# Patient Record
Sex: FEMALE | Race: WHITE | URBAN - METROPOLITAN AREA
[De-identification: names, ages, dates, MRNs, and addresses within clinical notes are randomized per-mention and may not be internally consistent; named-entity substitution may affect disease eponyms.]

---

## 2017-02-04 ENCOUNTER — HOSPITAL ENCOUNTER (EMERGENCY)
Facility: CLINIC | Age: 67
Discharge: HOME | End: 2017-02-04
Attending: EMERGENCY MEDICINE | Admitting: EMERGENCY MEDICINE
Payer: COMMERCIAL

## 2017-02-04 ENCOUNTER — APPOINTMENT (OUTPATIENT)
Dept: GENERAL RADIOLOGY | Facility: CLINIC | Age: 67
End: 2017-02-04
Attending: EMERGENCY MEDICINE
Payer: COMMERCIAL

## 2017-02-04 VITALS
OXYGEN SATURATION: 97 % | SYSTOLIC BLOOD PRESSURE: 184 MMHG | HEIGHT: 64 IN | BODY MASS INDEX: 24.92 KG/M2 | HEART RATE: 80 BPM | WEIGHT: 146 LBS | TEMPERATURE: 98.3 F | DIASTOLIC BLOOD PRESSURE: 85 MMHG | RESPIRATION RATE: 18 BRPM

## 2017-02-04 DIAGNOSIS — M54.50 ACUTE RIGHT-SIDED LOW BACK PAIN WITHOUT SCIATICA: ICD-10-CM

## 2017-02-04 LAB
ALBUMIN UR-MCNC: NEGATIVE MG/DL
APPEARANCE UR: CLEAR
BACTERIA #/AREA URNS HPF: ABNORMAL /HPF
BILIRUB UR QL STRIP: NEGATIVE
COLOR UR AUTO: YELLOW
GLUCOSE UR STRIP-MCNC: NEGATIVE MG/DL
HGB UR QL STRIP: NEGATIVE
KETONES UR STRIP-MCNC: NEGATIVE MG/DL
LEUKOCYTE ESTERASE UR QL STRIP: NEGATIVE
MUCOUS THREADS #/AREA URNS LPF: PRESENT /LPF
NITRATE UR QL: NEGATIVE
PH UR STRIP: 6.5 PH (ref 5–7)
RBC #/AREA URNS AUTO: 2 /HPF (ref 0–2)
SP GR UR STRIP: 1.01 (ref 1–1.03)
SQUAMOUS #/AREA URNS AUTO: <1 /HPF (ref 0–1)
URN SPEC COLLECT METH UR: ABNORMAL
UROBILINOGEN UR STRIP-MCNC: NORMAL MG/DL (ref 0–2)
WBC #/AREA URNS AUTO: 1 /HPF (ref 0–2)

## 2017-02-04 PROCEDURE — 76705 ECHO EXAM OF ABDOMEN: CPT

## 2017-02-04 PROCEDURE — 73502 X-RAY EXAM HIP UNI 2-3 VIEWS: CPT

## 2017-02-04 PROCEDURE — 99285 EMERGENCY DEPT VISIT HI MDM: CPT | Mod: 25

## 2017-02-04 PROCEDURE — 72100 X-RAY EXAM L-S SPINE 2/3 VWS: CPT

## 2017-02-04 PROCEDURE — 81001 URINALYSIS AUTO W/SCOPE: CPT | Performed by: EMERGENCY MEDICINE

## 2017-02-04 PROCEDURE — 25000132 ZZH RX MED GY IP 250 OP 250 PS 637: Performed by: EMERGENCY MEDICINE

## 2017-02-04 RX ORDER — OXYCODONE HYDROCHLORIDE 5 MG/1
5-10 TABLET ORAL EVERY 6 HOURS PRN
Qty: 15 TABLET | Refills: 0 | Status: ON HOLD | OUTPATIENT
Start: 2017-02-04 | End: 2017-05-10

## 2017-02-04 RX ORDER — METHYLPREDNISOLONE 4 MG
TABLET, DOSE PACK ORAL
Qty: 21 TABLET | Refills: 0 | Status: ON HOLD | OUTPATIENT
Start: 2017-02-04 | End: 2017-05-10

## 2017-02-04 RX ORDER — OXYCODONE HYDROCHLORIDE 5 MG/1
10 TABLET ORAL ONCE
Status: COMPLETED | OUTPATIENT
Start: 2017-02-04 | End: 2017-02-04

## 2017-02-04 RX ORDER — METOPROLOL SUCCINATE 100 MG/1
100 TABLET, EXTENDED RELEASE ORAL DAILY
Status: ON HOLD | COMMUNITY
Start: 2016-12-13 | End: 2017-05-10

## 2017-02-04 RX ORDER — LISINOPRIL AND HYDROCHLOROTHIAZIDE 12.5; 2 MG/1; MG/1
1 TABLET ORAL
Status: ON HOLD | COMMUNITY
Start: 2016-12-13 | End: 2017-05-10

## 2017-02-04 RX ORDER — AMOXICILLIN 250 MG
2 CAPSULE ORAL 2 TIMES DAILY
Qty: 120 TABLET | Refills: 1 | Status: ON HOLD | OUTPATIENT
Start: 2017-02-04 | End: 2017-05-10

## 2017-02-04 RX ADMIN — OXYCODONE HYDROCHLORIDE 10 MG: 5 TABLET ORAL at 08:42

## 2017-02-04 ASSESSMENT — ENCOUNTER SYMPTOMS
BACK PAIN: 1
FEVER: 0

## 2017-02-04 NOTE — ED PROVIDER NOTES
"  History     Chief Complaint:  Back Pain      HPI   Daly Dixon is a 66 year old female, S/P right hip replacement, who presents with back pain. The patient reports the sudden onset of right-sided lumbar back pain yesterday after coughing. She reports radiation to her right groin and exacerbation with movement. She reports significant relief with using a heating pad last night. She denies fevers. She denies incontinence. The patient reports she took 600 mg of Advil today. She denies a history of back pain. Her orthopedist is Dr. Bob.  No numbness, weakness, bowel or bladder incontinence.  No history of cancer, IVDU, TB, unintentional weight loss.    Allergies:  NKDA     Medications:    Lisinopril-hydrochlorothiazide  Metoprolol     Past Medical History:    HLD   HTN    Past Surgical History:    Tonsillectomy and adenoidectomy  Right hip replacement     Family History:    History reviewed. No pertinent family history.       Social History:  Marital status:   Never smoker, positive for alcohol use.  The patient presents with .  From [place] originally.  She has four children.    Review of Systems   Constitutional: Negative for fever.   Musculoskeletal: Positive for back pain.   All other systems reviewed and are negative.    Physical Exam     Patient Vitals for the past 24 hrs:   BP Temp Temp src Pulse Resp SpO2 Height Weight   02/04/17 1005 184/85 mmHg - - 80 18 97 % - -   02/04/17 0915 (!) 193/109 mmHg - - 83 18 96 % - -   02/04/17 0826 190/90 mmHg - - - - - - -   02/04/17 0817 (!) 198/95 mmHg 98.3  F (36.8  C) Oral 96 18 96 % 1.626 m (5' 4\") 66.225 kg (146 lb)      Physical Exam  General: Well-nourished, appears to be in pain  Eyes: PERRL, conjunctivae pink no scleral icterus or conjunctival injection  ENT:  Moist mucus membranes, posterior oropharynx clear without erythema or exudates  Respiratory:  Lungs clear to auscultation bilaterally, no crackles/rubs/wheezes.  Good air movement  CV: " Normal rate and rhythm, no murmurs/rubs/gallops  GI:  Abdomen soft and non-distended.  Normoactive BS.  No tenderness, guarding or rebound  Skin: Warm, dry.  No rashes or petechiae  Musculoskeletal: No peripheral edema or calf tenderness.  Tender over right mid lumbar paraspinal muscles  Neuro: Alert and oriented to person/place/time. Normal saddle sensation.  Normal and symmetric reflexes at patella tendon bilaterally.  Normal and symmetric strength at BLE.  Psychiatric: Normal affect      Emergency Department Course     Imaging:  Radiographic findings were communicated with the patient who voiced understanding of the findings.    Pelvis with Right Hip XR per radiology:   Again seen are surgical changes of right total hip arthroplasty. The hardware remains intact and unremarkable with no adjacent lucency seen to suggest loosening. Previously seen overlying skin staples have been removed. No fracture is seen. There has been development or progression of prominent degenerative changes of the left hip joint including complete superior joint space loss. No other change or abnormality is seen.     Lumbar Spine XR per radiology:   Degenerative changes as described above.      Laboratory:  UA: Clear, yellow urine; Bacteria Few (A) Mucous Present (A) Rest WNL     Procedures:  ED Bedside Limited Abdominal Ultrasound:  In process by Darlin Jones MD (02/04/17 15:40:59)     Impression:     No aortic aneurysm apparent on bedside ultrasound       Interventions:  0842: Oxycodone, 10 mg, PO      ED Course:  Nursing notes and vitals reviewed.  I performed an exam of the patient as documented above.     0928: I performed the above procedure. I updated the patient on the results. She is ready for discharge.    I personally reviewed the laboratory and imaging results with the patient and answered all related questions prior to discharge.   Findings and plan explained to the patient. Patient discharged home with instructions regarding  supportive care, medications, and reasons to return. The importance of close follow-up was reviewed.     Impression & Plan      Medical Decision Making:  Daly Dixon is a delightful 66 year old patient who presents with back pain.  Pain has improved with interventions in the emergency department.  Xrays were obtained of her lumbar spine and her hip and showed no acute fractures.  A bedside ultrasound showed no aortic aneurysm concerning for a aortic rupture. She has not had a fever, saddle/perineal anesthesia, bilateral foot numbness, or bowel or bladder dysfunction.  He has no red flags in history of cancer or IVDU. There is no clinical evidence of cauda equina syndrome, discitis, spinal/epidural space hematoma or epidural abscess. The neurological exam is normal and the patient's symptoms are consistent with a musculoskeletal (myofascial) strain vs a herniated disc. The patient will be discharged with pain medications to use as directed.  Ice or heat to the back and stretching exercises.  No heavy lifting, bending or twisting. Return if increasing pain, numbness, weakness, or bowel or bladder dysfunction.  The patient was advised to schedule follow-up with his/her primary doctor within 3 days to re-assess symptoms.  She also plans to see Dr. Alas from [facility] who cares for her .    Diagnosis:    ICD-10-CM    1. Acute right-sided low back pain without sciatica M54.5      Disposition:   Discharge to home with primary care follow up.     Discharge Medications:   New Prescriptions    METHYLPREDNISOLONE (MEDROL DOSEPAK) 4 MG TABLET    Follow package instructions    OXYCODONE (ROXICODONE) 5 MG IR TABLET    Take 1-2 tablets (5-10 mg) by mouth every 6 hours as needed for breakthrough pain or moderate to severe pain    SENNA-DOCUSATE (SENOKOT-S;PERICOLACE) 8.6-50 MG PER TABLET    Take 2 tablets by mouth 2 times daily     Rosa Maria LIU am serving as a scribe on 2/4/2017 at 8:30 AM to personally document services  performed by Darlin Jones MD, based on my observations and the provider's statements to me.              Darlin Jones MD  02/04/17 1546

## 2017-02-04 NOTE — ED AVS SNAPSHOT
Emergency Department    [address] [facility] [address]    Phone:  [phone]    Fax:  [phone]                                       Daly Dixon   MRN: [id number]    Department:   Emergency Department   Date of Visit:  2/4/2017           Patient Information     Date Of Birth          1950        Your diagnoses for this visit were:     Acute right-sided low back pain without sciatica        You were seen by Darlin Jones MD.      Follow-up Information     Follow up with RHONDA ERVIN MD PA. Schedule an appointment as soon as possible for a visit in 1 week.    Contact information:    [address]   [address]  [facility] [phone]        Discharge Instructions       *You may resume diet and activities as tolerated.  *Take medications as prescribed.  Tylenol and/or ibuprofen for pain, oxycodone for severe breakthrough pain.  Solumedrol dose pack if your symptoms continue or worsen. Continue your current medications.  *Follow-up with your doctor in the next 2-3 days for reevaluation and ongoing medication prescriptions.  Follow-up with Dr. Lam regarding physical therapy and other treatments.   *Return if you develop numbness, weakness, bowel or bladder incontinence, faint or feel like you will faint or become worse in any way.    Discharge Instructions  Back Pain  You were seen today for back pain. Back pain can have many causes, but most will get better without surgery or other specific treatment. Sometimes there is a herniated ( slipped ) disc. We don t usually do MRI scans to look for these right away, since most herniated discs will get better on their own with time.  Today, we did not find any evidence that your back pain was caused by a serious condition, such as an infection, fracture, or tumor. However, sometimes symptoms develop over time and cannot be found during an emergency visit, so it is very important that you follow up with your primary doctor.  Return to the  Emergency Department if:    You develop a fever with your back pain.     You have weakness or change in sensation in one or both legs.    You lose control of your bowels or bladder, or can t empty your bladder.    Your pain gets much worse.     Follow-up with your doctor:    Unless your pain has completely gone away, please make an appointment with your doctor within one week.  You may need further management of your back pain, such as more pain medication, imaging such as an X-ray or MRI, or physical therapy.    What can I do to help myself?    Remain active -- People are often afraid that they will hurt their back further or delay recovery by remaining active, but this is one of the best things you can do for your back. In fact, prolonged bed rest is not recommended. Studies have shown that people with low back pain recover faster when they remain active. Movement helps to bring blood flow to the muscles and relieve muscle spasms as well as preventing loss of muscle strength.    Heat -- Using a heating pad can help with low back pain during the first few weeks. Do not sleep with a heating pad, as you can be burned.     Pain medications -- Take a pain medication such as, acetaminophen (Tylenol ), ibuprofen (Advil , Nuprin  ) or naproxen (Aleve ).  If you have been given a narcotic (such as codeine, hydrocodone, or oxycodone) or a muscle relaxant (such as Flexeril   or Soma  ), do not drive for four hours after you have taken it. If the narcotic contains acetaminophen (Tylenol), do not take Tylenol with it. All narcotics will cause constipation, so eat a high fiber diet.          Remember that you can always come back to the Emergency Department if you are not able to see your regular doctor in the amount of time listed above, if you get any new symptoms, or if there is anything that worries you.    Opioid Medication Information    You have been given a prescription for an opioid (narcotic) pain medicine and/or have  received a pain medicine while here in the Emergency Department. These medicines can make you drowsy or impaired. You must not drive, operate dangerous equipment, or engage in any other dangerous activities while taking these medications. If you drive while taking these medications, you could be arrested for DUI, or driving under the influence. Do not drink any alcohol while you are taking these medications.   Opioid pain medications can cause addiction. If you have a history of chemical dependency of any type, you are at a higher risk of becoming addicted to pain medications.  Only take these prescribed medications to treat your pain when all other options have been tried. Take it for as short a time and as few doses as possible. Store your pain pills in a secure place, as they are frequently stolen and provide a dangerous opportunity for children or visitors in your house to start abusing these powerful medications. We will not replace any lost or stolen medicine.  As soon as your pain is better, you should flush all your remaining medication.   Many prescription pain medications contain Tylenol  (acetaminophen), including Vicodin , Tylenol #3 , Norco , Lortab , and Percocet .  You should not take any extra pills of Tylenol  if you are using these prescription medications or you can get very sick.  Do not ever take more than 4000 mg of acetaminophen in any 24 hour period.  All opioids tend to cause constipation. Drink plenty of water and eat foods that have a lot of fiber, such as fruits, vegetables, prune juice, apple juice and high fiber cereal.  Take a laxative if you don t move your bowels at least every other day. Miralax , Milk of Magnesia, Colace , or Senna  can be used to keep you regular.                24 Hour Appointment Hotline       To make an appointment at any [facility] clinic, call [phone] ([phone]). If you don't have a family doctor or clinic, we will help you find one. [facility]  clinics are conveniently located to serve the needs of you and your family.             Review of your medicines      START taking        Dose / Directions Last dose taken    methylPREDNISolone 4 MG tablet   Commonly known as:  MEDROL DOSEPAK   Quantity:  21 tablet        Follow package instructions   Refills:  0        oxyCODONE 5 MG IR tablet   Commonly known as:  ROXICODONE   Dose:  5-10 mg   Quantity:  15 tablet        Take 1-2 tablets (5-10 mg) by mouth every 6 hours as needed for breakthrough pain or moderate to severe pain   Refills:  0        senna-docusate 8.6-50 MG per tablet   Commonly known as:  SENOKOT-S;PERICOLACE   Dose:  2 tablet   Quantity:  120 tablet        Take 2 tablets by mouth 2 times daily   Refills:  1          Our records show that you are taking the medicines listed below. If these are incorrect, please call your family doctor or clinic.        Dose / Directions Last dose taken    lisinopril-hydrochlorothiazide 20-12.5 MG per tablet   Commonly known as:  PRINZIDE/ZESTORETIC   Dose:  1 tablet        Take 1 tablet by mouth   Refills:  0        metoprolol 100 MG 24 hr tablet   Commonly known as:  TOPROL-XL   Dose:  100 mg        Take 100 mg by mouth   Refills:  0                Prescriptions were sent or printed at these locations (3 Prescriptions)                   Other Prescriptions                Printed at Department/Unit printer (3 of 3)         oxyCODONE (ROXICODONE) 5 MG IR tablet               methylPREDNISolone (MEDROL DOSEPAK) 4 MG tablet               senna-docusate (SENOKOT-S;PERICOLACE) 8.6-50 MG per tablet                Procedures and tests performed during your visit     Lumbar spine XR, 2-3 views    POC US ABDOMEN LIMITED    UA with Microscopic    XR Pelvis w Hip Right 1 View      Orders Needing Specimen Collection     None      Pending Results     Date and Time Order Name Status Description    2/4/2017 8686 POC US ABDOMEN LIMITED In process             Pending Culture  Results     No orders found from 2/3/2017 to 2/5/2017.       Test Results from your hospital stay           2/4/2017  9:24 AM - Interface, Radiant Ib      Narrative     LUMBAR SPINE TWO VIEWS   2/4/2017  9:11 AM    HISTORY: Right-sided low back pain.    COMPARISON: None.    FINDINGS: There is a grade 1 spondylolisthesis at L4-L5 which appears  to be degenerative. Vertebral body alignment is otherwise normal with  no fracture or osseous lesion seen. There is mild disc height loss and  marginal osteophyte formation at L2-L3. The remaining disc spaces are  well preserved. There is calcification in the vascular structures.        Impression     IMPRESSION: Degenerative changes as described above.     SONAL TORRES MD         2/4/2017  8:40 AM - Service Account, [facility]         2/4/2017  9:24 AM - Interface, Flexilab Results      Component Results     Component Value Ref Range & Units Status    Color Urine Yellow  Final    Appearance Urine Clear  Final    Glucose Urine Negative NEG mg/dL Final    Bilirubin Urine Negative NEG Final    Ketones Urine Negative NEG mg/dL Final    Specific Gravity Urine 1.014 1.003 - 1.035 Final    Blood Urine Negative NEG Final    pH Urine 6.5 5.0 - 7.0 pH Final    Protein Albumin Urine Negative NEG mg/dL Final    Urobilinogen mg/dL Normal 0.0 - 2.0 mg/dL Final    Nitrite Urine Negative NEG Final    Leukocyte Esterase Urine Negative NEG Final    Source Midstream Urine  Final    WBC Urine 1 0 - 2 /HPF Final    RBC Urine 2 0 - 2 /HPF Final    Bacteria Urine Few (A) NEG /HPF Final    Squamous Epithelial /HPF Urine <1 0 - 1 /HPF Final    Mucous Urine Present (A) NEG /LPF Final         2/4/2017  9:24 AM - Interface, Radiant Ib      Narrative     PELVIS AND RIGHT HIP ONE VIEW   2/4/2017 9:11 AM    HISTORY: Right hip pain.    COMPARISON: 11/11/2010        Impression     IMPRESSION: Again seen are surgical changes of right total hip  arthroplasty. The hardware remains intact and unremarkable  with no  adjacent lucency seen to suggest loosening. Previously seen overlying  skin staples have been removed. No fracture is seen. There has been  development or progression of prominent degenerative changes of the  left hip joint including complete superior joint space loss. No other  change or abnormality is seen.     SONAL TORRES MD                Clinical Quality Measure: Blood Pressure Screening     Your blood pressure was checked while you were in the emergency department today. The last reading we obtained was  BP: (!) 193/109 mmHg . Please read the guidelines below about what these numbers mean and what you should do about them.  If your systolic blood pressure (the top number) is less than 120 and your diastolic blood pressure (the bottom number) is less than 80, then your blood pressure is normal. There is nothing more that you need to do about it.  If your systolic blood pressure (the top number) is 120-139 or your diastolic blood pressure (the bottom number) is 80-89, your blood pressure may be higher than it should be. You should have your blood pressure rechecked within a year by a primary care provider.  If your systolic blood pressure (the top number) is 140 or greater or your diastolic blood pressure (the bottom number) is 90 or greater, you may have high blood pressure. High blood pressure is treatable, but if left untreated over time it can put you at risk for heart attack, stroke, or kidney failure. You should have your blood pressure rechecked by a primary care provider within the next 4 weeks.  If your provider in the emergency department today gave you specific instructions to follow-up with your doctor or provider even sooner than that, you should follow that instruction and not wait for up to 4 weeks for your follow-up visit.        Thank you for choosing [facility]       Thank you for choosing [facility] for your care. Our goal is always to provide you with excellent care. Hearing back  "from our patients is one way we can continue to improve our services. Please take a few minutes to complete the written survey that you may receive in the mail after you visit with us. Thank you!        [employer] Information     [employer] lets you send messages to your doctor, view your test results, renew your prescriptions, schedule appointments and more. To sign up, go to [email or web address] . Click on \"Log in\" on the left side of the screen, which will take you to the Welcome page. Then click on \"Sign up Now\" on the right side of the page.     You will be asked to enter the access code listed below, as well as some personal information. Please follow the directions to create your username and password.     Your access code is: [email or web address]  Expires: 2017  9:46 AM     Your access code will  in 90 days. If you need help or a new code, please call your [facility] clinic or [phone].        Care EveryWhere ID     This is your Care EveryWhere ID. This could be used by other organizations to access your [facility] medical records  [id number]        After Visit Summary       This is your record. Keep this with you and show to your community pharmacist(s) and doctor(s) at your next visit.                  "

## 2017-02-04 NOTE — ED AVS SNAPSHOT
Emergency Department    [address] [place] [address]    Phone:  [phone]    Fax:  [phone]                                       Daly Dixon   MRN: [id number]    Department:   Emergency Department   Date of Visit:  2/4/2017           After Visit Summary Signature Page     I have received my discharge instructions, and my questions have been answered. I have discussed any challenges I see with this plan with the nurse or doctor.    ..........................................................................................................................................  Patient/Patient Representative Signature      ..........................................................................................................................................  Patient Representative Print Name and Relationship to Patient    ..................................................               ................................................  Date                                            Time    ..........................................................................................................................................  Reviewed by Signature/Title    ...................................................              ..............................................  Date                                                            Time

## 2017-02-04 NOTE — DISCHARGE INSTRUCTIONS
*You may resume diet and activities as tolerated.  *Take medications as prescribed.  Tylenol and/or ibuprofen for pain, oxycodone for severe breakthrough pain.  Solumedrol dose pack if your symptoms continue or worsen. Continue your current medications.  *Follow-up with your doctor in the next 2-3 days for reevaluation and ongoing medication prescriptions.  Follow-up with Dr. Lam regarding physical therapy and other treatments.   *Return if you develop numbness, weakness, bowel or bladder incontinence, faint or feel like you will faint or become worse in any way.    Discharge Instructions  Back Pain  You were seen today for back pain. Back pain can have many causes, but most will get better without surgery or other specific treatment. Sometimes there is a herniated ( slipped ) disc. We don t usually do MRI scans to look for these right away, since most herniated discs will get better on their own with time.  Today, we did not find any evidence that your back pain was caused by a serious condition, such as an infection, fracture, or tumor. However, sometimes symptoms develop over time and cannot be found during an emergency visit, so it is very important that you follow up with your primary doctor.  Return to the Emergency Department if:    You develop a fever with your back pain.     You have weakness or change in sensation in one or both legs.    You lose control of your bowels or bladder, or can t empty your bladder.    Your pain gets much worse.     Follow-up with your doctor:    Unless your pain has completely gone away, please make an appointment with your doctor within one week.  You may need further management of your back pain, such as more pain medication, imaging such as an X-ray or MRI, or physical therapy.    What can I do to help myself?    Remain active -- People are often afraid that they will hurt their back further or delay recovery by remaining active, but this is one of the best things you can do  for your back. In fact, prolonged bed rest is not recommended. Studies have shown that people with low back pain recover faster when they remain active. Movement helps to bring blood flow to the muscles and relieve muscle spasms as well as preventing loss of muscle strength.    Heat -- Using a heating pad can help with low back pain during the first few weeks. Do not sleep with a heating pad, as you can be burned.     Pain medications -- Take a pain medication such as, acetaminophen (Tylenol ), ibuprofen (Advil , Nuprin  ) or naproxen (Aleve ).  If you have been given a narcotic (such as codeine, hydrocodone, or oxycodone) or a muscle relaxant (such as Flexeril   or Soma  ), do not drive for four hours after you have taken it. If the narcotic contains acetaminophen (Tylenol), do not take Tylenol with it. All narcotics will cause constipation, so eat a high fiber diet.          Remember that you can always come back to the Emergency Department if you are not able to see your regular doctor in the amount of time listed above, if you get any new symptoms, or if there is anything that worries you.    Opioid Medication Information    You have been given a prescription for an opioid (narcotic) pain medicine and/or have received a pain medicine while here in the Emergency Department. These medicines can make you drowsy or impaired. You must not drive, operate dangerous equipment, or engage in any other dangerous activities while taking these medications. If you drive while taking these medications, you could be arrested for DUI, or driving under the influence. Do not drink any alcohol while you are taking these medications.   Opioid pain medications can cause addiction. If you have a history of chemical dependency of any type, you are at a higher risk of becoming addicted to pain medications.  Only take these prescribed medications to treat your pain when all other options have been tried. Take it for as short a time and  as few doses as possible. Store your pain pills in a secure place, as they are frequently stolen and provide a dangerous opportunity for children or visitors in your house to start abusing these powerful medications. We will not replace any lost or stolen medicine.  As soon as your pain is better, you should flush all your remaining medication.   Many prescription pain medications contain Tylenol  (acetaminophen), including Vicodin , Tylenol #3 , Norco , Lortab , and Percocet .  You should not take any extra pills of Tylenol  if you are using these prescription medications or you can get very sick.  Do not ever take more than 4000 mg of acetaminophen in any 24 hour period.  All opioids tend to cause constipation. Drink plenty of water and eat foods that have a lot of fiber, such as fruits, vegetables, prune juice, apple juice and high fiber cereal.  Take a laxative if you don t move your bowels at least every other day. Miralax , Milk of Magnesia, Colace , or Senna  can be used to keep you regular.

## 2017-05-10 ENCOUNTER — APPOINTMENT (OUTPATIENT)
Dept: PHYSICAL THERAPY | Facility: CLINIC | Age: 67
DRG: 470 | End: 2017-05-10
Attending: ORTHOPAEDIC SURGERY
Payer: MEDICARE

## 2017-05-10 ENCOUNTER — APPOINTMENT (OUTPATIENT)
Dept: GENERAL RADIOLOGY | Facility: CLINIC | Age: 67
DRG: 470 | End: 2017-05-10
Attending: ORTHOPAEDIC SURGERY
Payer: MEDICARE

## 2017-05-10 ENCOUNTER — ANESTHESIA (OUTPATIENT)
Dept: SURGERY | Facility: CLINIC | Age: 67
DRG: 470 | End: 2017-05-10
Payer: MEDICARE

## 2017-05-10 ENCOUNTER — HOSPITAL ENCOUNTER (INPATIENT)
Facility: CLINIC | Age: 67
LOS: 1 days | Discharge: HOME | DRG: 470 | End: 2017-05-11
Attending: ORTHOPAEDIC SURGERY | Admitting: ORTHOPAEDIC SURGERY
Payer: MEDICARE

## 2017-05-10 ENCOUNTER — ANESTHESIA EVENT (OUTPATIENT)
Dept: SURGERY | Facility: CLINIC | Age: 67
DRG: 470 | End: 2017-05-10
Payer: MEDICARE

## 2017-05-10 DIAGNOSIS — Z96.642 STATUS POST TOTAL REPLACEMENT OF LEFT HIP: Primary | ICD-10-CM

## 2017-05-10 PROBLEM — Z96.649 S/P TOTAL HIP ARTHROPLASTY: Status: ACTIVE | Noted: 2017-05-10

## 2017-05-10 LAB
ABO + RH BLD: NORMAL
ABO + RH BLD: NORMAL
BLD GP AB SCN SERPL QL: NORMAL
BLOOD BANK CMNT PATIENT-IMP: NORMAL
CREAT SERPL-MCNC: 0.76 MG/DL (ref 0.52–1.04)
GFR SERPL CREATININE-BSD FRML MDRD: 77 ML/MIN/1.7M2
POTASSIUM SERPL-SCNC: 3.7 MMOL/L (ref 3.4–5.3)
SPECIMEN EXP DATE BLD: NORMAL

## 2017-05-10 PROCEDURE — 25800025 ZZH RX 258: Performed by: ORTHOPAEDIC SURGERY

## 2017-05-10 PROCEDURE — 86850 RBC ANTIBODY SCREEN: CPT | Performed by: ANESTHESIOLOGY

## 2017-05-10 PROCEDURE — C1776 JOINT DEVICE (IMPLANTABLE): HCPCS | Performed by: ORTHOPAEDIC SURGERY

## 2017-05-10 PROCEDURE — 37000008 ZZH ANESTHESIA TECHNICAL FEE, 1ST 30 MIN: Performed by: ORTHOPAEDIC SURGERY

## 2017-05-10 PROCEDURE — 25000132 ZZH RX MED GY IP 250 OP 250 PS 637: Mod: GY | Performed by: ORTHOPAEDIC SURGERY

## 2017-05-10 PROCEDURE — 36000063 ZZH SURGERY LEVEL 4 EA 15 ADDTL MIN: Performed by: ORTHOPAEDIC SURGERY

## 2017-05-10 PROCEDURE — 93010 ELECTROCARDIOGRAM REPORT: CPT | Performed by: INTERNAL MEDICINE

## 2017-05-10 PROCEDURE — 27810169 ZZH OR IMPLANT GENERAL: Performed by: ORTHOPAEDIC SURGERY

## 2017-05-10 PROCEDURE — 25000125 ZZHC RX 250: Performed by: NURSE ANESTHETIST, CERTIFIED REGISTERED

## 2017-05-10 PROCEDURE — 0SRB01A REPLACEMENT OF LEFT HIP JOINT WITH METAL SYNTHETIC SUBSTITUTE, UNCEMENTED, OPEN APPROACH: ICD-10-PCS | Performed by: ORTHOPAEDIC SURGERY

## 2017-05-10 PROCEDURE — 36000093 ZZH SURGERY LEVEL 4 1ST 30 MIN: Performed by: ORTHOPAEDIC SURGERY

## 2017-05-10 PROCEDURE — 25000128 H RX IP 250 OP 636: Performed by: ORTHOPAEDIC SURGERY

## 2017-05-10 PROCEDURE — 25000128 H RX IP 250 OP 636: Performed by: NURSE ANESTHETIST, CERTIFIED REGISTERED

## 2017-05-10 PROCEDURE — 93005 ELECTROCARDIOGRAM TRACING: CPT

## 2017-05-10 PROCEDURE — 84132 ASSAY OF SERUM POTASSIUM: CPT | Performed by: ANESTHESIOLOGY

## 2017-05-10 PROCEDURE — 97530 THERAPEUTIC ACTIVITIES: CPT | Mod: GP

## 2017-05-10 PROCEDURE — S5010 5% DEXTROSE AND 0.45% SALINE: HCPCS | Performed by: ORTHOPAEDIC SURGERY

## 2017-05-10 PROCEDURE — 40000986 XR PELVIS AND HIP PORTABLE LEFT 1 VIEW

## 2017-05-10 PROCEDURE — 71000012 ZZH RECOVERY PHASE 1 LEVEL 1 FIRST HR: Performed by: ORTHOPAEDIC SURGERY

## 2017-05-10 PROCEDURE — 82565 ASSAY OF CREATININE: CPT | Performed by: ANESTHESIOLOGY

## 2017-05-10 PROCEDURE — A9270 NON-COVERED ITEM OR SERVICE: HCPCS | Mod: GY | Performed by: ORTHOPAEDIC SURGERY

## 2017-05-10 PROCEDURE — 25800025 ZZH RX 258: Performed by: ANESTHESIOLOGY

## 2017-05-10 PROCEDURE — 86900 BLOOD TYPING SEROLOGIC ABO: CPT | Performed by: ANESTHESIOLOGY

## 2017-05-10 PROCEDURE — 40000170 ZZH STATISTIC PRE-PROCEDURE ASSESSMENT II: Performed by: ORTHOPAEDIC SURGERY

## 2017-05-10 PROCEDURE — 25000125 ZZHC RX 250: Performed by: ANESTHESIOLOGY

## 2017-05-10 PROCEDURE — 36415 COLL VENOUS BLD VENIPUNCTURE: CPT | Performed by: ANESTHESIOLOGY

## 2017-05-10 PROCEDURE — 97110 THERAPEUTIC EXERCISES: CPT | Mod: GP

## 2017-05-10 PROCEDURE — 86901 BLOOD TYPING SEROLOGIC RH(D): CPT | Performed by: ANESTHESIOLOGY

## 2017-05-10 PROCEDURE — 97161 PT EVAL LOW COMPLEX 20 MIN: CPT | Mod: GP

## 2017-05-10 PROCEDURE — 27210995 ZZH RX 272: Performed by: ORTHOPAEDIC SURGERY

## 2017-05-10 PROCEDURE — 12000007 ZZH R&B INTERMEDIATE

## 2017-05-10 PROCEDURE — 40000193 ZZH STATISTIC PT WARD VISIT

## 2017-05-10 PROCEDURE — 37000009 ZZH ANESTHESIA TECHNICAL FEE, EACH ADDTL 15 MIN: Performed by: ORTHOPAEDIC SURGERY

## 2017-05-10 PROCEDURE — 27210794 ZZH OR GENERAL SUPPLY STERILE: Performed by: ORTHOPAEDIC SURGERY

## 2017-05-10 DEVICE — IMP HEAD FEMORAL SNN 12/14 OXIN 36MM -3MM 71343603: Type: IMPLANTABLE DEVICE | Site: HIP | Status: FUNCTIONAL

## 2017-05-10 DEVICE — IMPLANTABLE DEVICE: Type: IMPLANTABLE DEVICE | Site: HIP | Status: FUNCTIONAL

## 2017-05-10 DEVICE — IMP SHELL SNR ACET R3 3H 52MM 71335552: Type: IMPLANTABLE DEVICE | Site: HIP | Status: FUNCTIONAL

## 2017-05-10 DEVICE — IMP HOLE COVER SNN ACETAB CUP REFLEC INTERFIT 71336500: Type: IMPLANTABLE DEVICE | Site: HIP | Status: FUNCTIONAL

## 2017-05-10 RX ORDER — FENTANYL CITRATE 50 UG/ML
INJECTION, SOLUTION INTRAMUSCULAR; INTRAVENOUS PRN
Status: DISCONTINUED | OUTPATIENT
Start: 2017-05-10 | End: 2017-05-10

## 2017-05-10 RX ORDER — MEPERIDINE HYDROCHLORIDE 25 MG/ML
12.5 INJECTION INTRAMUSCULAR; INTRAVENOUS; SUBCUTANEOUS EVERY 5 MIN PRN
Status: CANCELLED | OUTPATIENT
Start: 2017-05-10

## 2017-05-10 RX ORDER — EPHEDRINE SULFATE 50 MG/ML
INJECTION, SOLUTION INTRAMUSCULAR; INTRAVENOUS; SUBCUTANEOUS PRN
Status: DISCONTINUED | OUTPATIENT
Start: 2017-05-10 | End: 2017-05-10

## 2017-05-10 RX ORDER — CEFAZOLIN SODIUM 2 G/100ML
2 INJECTION, SOLUTION INTRAVENOUS
Status: COMPLETED | OUTPATIENT
Start: 2017-05-10 | End: 2017-05-10

## 2017-05-10 RX ORDER — CEFAZOLIN SODIUM 1 G/3ML
1 INJECTION, POWDER, FOR SOLUTION INTRAMUSCULAR; INTRAVENOUS EVERY 8 HOURS
Status: COMPLETED | OUTPATIENT
Start: 2017-05-10 | End: 2017-05-11

## 2017-05-10 RX ORDER — HYDROMORPHONE HYDROCHLORIDE 1 MG/ML
.3-.5 INJECTION, SOLUTION INTRAMUSCULAR; INTRAVENOUS; SUBCUTANEOUS EVERY 5 MIN PRN
Status: DISCONTINUED | OUTPATIENT
Start: 2017-05-10 | End: 2017-05-10 | Stop reason: HOSPADM

## 2017-05-10 RX ORDER — FENTANYL CITRATE 0.05 MG/ML
25-50 INJECTION, SOLUTION INTRAMUSCULAR; INTRAVENOUS
Status: CANCELLED | OUTPATIENT
Start: 2017-05-10

## 2017-05-10 RX ORDER — ONDANSETRON 2 MG/ML
INJECTION INTRAMUSCULAR; INTRAVENOUS PRN
Status: DISCONTINUED | OUTPATIENT
Start: 2017-05-10 | End: 2017-05-10

## 2017-05-10 RX ORDER — PROPOFOL 10 MG/ML
INJECTION, EMULSION INTRAVENOUS CONTINUOUS PRN
Status: DISCONTINUED | OUTPATIENT
Start: 2017-05-10 | End: 2017-05-10

## 2017-05-10 RX ORDER — ONDANSETRON 2 MG/ML
4 INJECTION INTRAMUSCULAR; INTRAVENOUS EVERY 30 MIN PRN
Status: DISCONTINUED | OUTPATIENT
Start: 2017-05-10 | End: 2017-05-10 | Stop reason: HOSPADM

## 2017-05-10 RX ORDER — MAGNESIUM HYDROXIDE 1200 MG/15ML
LIQUID ORAL PRN
Status: DISCONTINUED | OUTPATIENT
Start: 2017-05-10 | End: 2017-05-10 | Stop reason: HOSPADM

## 2017-05-10 RX ORDER — BUPIVACAINE HYDROCHLORIDE 7.5 MG/ML
INJECTION, SOLUTION INTRASPINAL PRN
Status: DISCONTINUED | OUTPATIENT
Start: 2017-05-10 | End: 2017-05-10

## 2017-05-10 RX ORDER — LIDOCAINE HYDROCHLORIDE 20 MG/ML
INJECTION, SOLUTION INFILTRATION; PERINEURAL PRN
Status: DISCONTINUED | OUTPATIENT
Start: 2017-05-10 | End: 2017-05-10

## 2017-05-10 RX ORDER — SODIUM CHLORIDE, SODIUM LACTATE, POTASSIUM CHLORIDE, CALCIUM CHLORIDE 600; 310; 30; 20 MG/100ML; MG/100ML; MG/100ML; MG/100ML
INJECTION, SOLUTION INTRAVENOUS CONTINUOUS
Status: CANCELLED | OUTPATIENT
Start: 2017-05-10

## 2017-05-10 RX ORDER — METOPROLOL SUCCINATE 100 MG/1
100 TABLET, EXTENDED RELEASE ORAL DAILY
Status: DISCONTINUED | OUTPATIENT
Start: 2017-05-11 | End: 2017-05-11 | Stop reason: HOSPADM

## 2017-05-10 RX ORDER — CEFAZOLIN SODIUM 1 G/3ML
1 INJECTION, POWDER, FOR SOLUTION INTRAMUSCULAR; INTRAVENOUS SEE ADMIN INSTRUCTIONS
Status: DISCONTINUED | OUTPATIENT
Start: 2017-05-10 | End: 2017-05-10 | Stop reason: HOSPADM

## 2017-05-10 RX ORDER — FENTANYL CITRATE 50 UG/ML
25-50 INJECTION, SOLUTION INTRAMUSCULAR; INTRAVENOUS
Status: DISCONTINUED | OUTPATIENT
Start: 2017-05-10 | End: 2017-05-10 | Stop reason: HOSPADM

## 2017-05-10 RX ORDER — AMLODIPINE BESYLATE 5 MG/1
5 TABLET ORAL DAILY
Status: DISCONTINUED | OUTPATIENT
Start: 2017-05-11 | End: 2017-05-11 | Stop reason: HOSPADM

## 2017-05-10 RX ORDER — LIDOCAINE 40 MG/G
CREAM TOPICAL
Status: DISCONTINUED | OUTPATIENT
Start: 2017-05-10 | End: 2017-05-11 | Stop reason: HOSPADM

## 2017-05-10 RX ORDER — LOSARTAN POTASSIUM AND HYDROCHLOROTHIAZIDE 12.5; 1 MG/1; MG/1
1 TABLET ORAL DAILY
Status: DISCONTINUED | OUTPATIENT
Start: 2017-05-10 | End: 2017-05-11 | Stop reason: HOSPADM

## 2017-05-10 RX ORDER — ONDANSETRON 4 MG/1
4 TABLET, ORALLY DISINTEGRATING ORAL EVERY 30 MIN PRN
Status: CANCELLED | OUTPATIENT
Start: 2017-05-10

## 2017-05-10 RX ORDER — ONDANSETRON 4 MG/1
4 TABLET, ORALLY DISINTEGRATING ORAL EVERY 30 MIN PRN
Status: DISCONTINUED | OUTPATIENT
Start: 2017-05-10 | End: 2017-05-10 | Stop reason: HOSPADM

## 2017-05-10 RX ORDER — OXYCODONE HYDROCHLORIDE 5 MG/1
5-10 TABLET ORAL
Status: DISCONTINUED | OUTPATIENT
Start: 2017-05-10 | End: 2017-05-11 | Stop reason: HOSPADM

## 2017-05-10 RX ORDER — CYCLOBENZAPRINE HCL 10 MG
10 TABLET ORAL 3 TIMES DAILY PRN
Status: DISCONTINUED | OUTPATIENT
Start: 2017-05-10 | End: 2017-05-11 | Stop reason: HOSPADM

## 2017-05-10 RX ORDER — SODIUM CHLORIDE, SODIUM LACTATE, POTASSIUM CHLORIDE, CALCIUM CHLORIDE 600; 310; 30; 20 MG/100ML; MG/100ML; MG/100ML; MG/100ML
INJECTION, SOLUTION INTRAVENOUS CONTINUOUS
Status: DISCONTINUED | OUTPATIENT
Start: 2017-05-10 | End: 2017-05-10 | Stop reason: HOSPADM

## 2017-05-10 RX ORDER — ONDANSETRON 2 MG/ML
4 INJECTION INTRAMUSCULAR; INTRAVENOUS EVERY 30 MIN PRN
Status: CANCELLED | OUTPATIENT
Start: 2017-05-10

## 2017-05-10 RX ORDER — ALBUTEROL SULFATE 0.83 MG/ML
2.5 SOLUTION RESPIRATORY (INHALATION) EVERY 4 HOURS PRN
Status: CANCELLED | OUTPATIENT
Start: 2017-05-10

## 2017-05-10 RX ORDER — LOSARTAN POTASSIUM AND HYDROCHLOROTHIAZIDE 12.5; 1 MG/1; MG/1
1 TABLET ORAL DAILY
COMMUNITY

## 2017-05-10 RX ORDER — HYDROMORPHONE HYDROCHLORIDE 1 MG/ML
.3-.5 INJECTION, SOLUTION INTRAMUSCULAR; INTRAVENOUS; SUBCUTANEOUS
Status: DISCONTINUED | OUTPATIENT
Start: 2017-05-10 | End: 2017-05-11 | Stop reason: HOSPADM

## 2017-05-10 RX ORDER — NALOXONE HYDROCHLORIDE 0.4 MG/ML
.1-.4 INJECTION, SOLUTION INTRAMUSCULAR; INTRAVENOUS; SUBCUTANEOUS
Status: DISCONTINUED | OUTPATIENT
Start: 2017-05-10 | End: 2017-05-11 | Stop reason: HOSPADM

## 2017-05-10 RX ORDER — ACETAMINOPHEN 325 MG/1
975 TABLET ORAL EVERY 8 HOURS
Status: DISCONTINUED | OUTPATIENT
Start: 2017-05-10 | End: 2017-05-11 | Stop reason: HOSPADM

## 2017-05-10 RX ADMIN — CEFAZOLIN SODIUM 1 G: 1 INJECTION, POWDER, FOR SOLUTION INTRAMUSCULAR; INTRAVENOUS at 15:42

## 2017-05-10 RX ADMIN — PHENYLEPHRINE HYDROCHLORIDE 100 MCG: 10 INJECTION, SOLUTION INTRAMUSCULAR; INTRAVENOUS; SUBCUTANEOUS at 08:19

## 2017-05-10 RX ADMIN — CEFAZOLIN SODIUM 2 G: 2 INJECTION, SOLUTION INTRAVENOUS at 07:50

## 2017-05-10 RX ADMIN — MIDAZOLAM HYDROCHLORIDE 0.5 MG: 1 INJECTION, SOLUTION INTRAMUSCULAR; INTRAVENOUS at 08:06

## 2017-05-10 RX ADMIN — HYDROMORPHONE HYDROCHLORIDE 0.5 MG: 1 INJECTION, SOLUTION INTRAMUSCULAR; INTRAVENOUS; SUBCUTANEOUS at 11:47

## 2017-05-10 RX ADMIN — SODIUM CHLORIDE, POTASSIUM CHLORIDE, SODIUM LACTATE AND CALCIUM CHLORIDE: 600; 310; 30; 20 INJECTION, SOLUTION INTRAVENOUS at 06:55

## 2017-05-10 RX ADMIN — ACETAMINOPHEN 975 MG: 325 TABLET, FILM COATED ORAL at 22:16

## 2017-05-10 RX ADMIN — MIDAZOLAM HYDROCHLORIDE 0.5 MG: 1 INJECTION, SOLUTION INTRAMUSCULAR; INTRAVENOUS at 08:22

## 2017-05-10 RX ADMIN — ONDANSETRON 4 MG: 2 INJECTION INTRAMUSCULAR; INTRAVENOUS at 07:46

## 2017-05-10 RX ADMIN — HYDROMORPHONE HYDROCHLORIDE 0.5 MG: 1 INJECTION, SOLUTION INTRAMUSCULAR; INTRAVENOUS; SUBCUTANEOUS at 13:51

## 2017-05-10 RX ADMIN — CEFAZOLIN SODIUM 1 G: 1 INJECTION, POWDER, FOR SOLUTION INTRAMUSCULAR; INTRAVENOUS at 23:31

## 2017-05-10 RX ADMIN — MIDAZOLAM HYDROCHLORIDE 1 MG: 1 INJECTION, SOLUTION INTRAMUSCULAR; INTRAVENOUS at 07:43

## 2017-05-10 RX ADMIN — ACETAMINOPHEN 975 MG: 325 TABLET, FILM COATED ORAL at 13:51

## 2017-05-10 RX ADMIN — LIDOCAINE HYDROCHLORIDE 1 ML: 10 INJECTION, SOLUTION EPIDURAL; INFILTRATION; INTRACAUDAL; PERINEURAL at 06:56

## 2017-05-10 RX ADMIN — PHENYLEPHRINE HYDROCHLORIDE 100 MCG: 10 INJECTION, SOLUTION INTRAMUSCULAR; INTRAVENOUS; SUBCUTANEOUS at 08:45

## 2017-05-10 RX ADMIN — PHENYLEPHRINE HYDROCHLORIDE 100 MCG: 10 INJECTION, SOLUTION INTRAMUSCULAR; INTRAVENOUS; SUBCUTANEOUS at 08:25

## 2017-05-10 RX ADMIN — DEXTROSE AND SODIUM CHLORIDE: 5; 450 INJECTION, SOLUTION INTRAVENOUS at 10:28

## 2017-05-10 RX ADMIN — OXYCODONE HYDROCHLORIDE 5 MG: 5 TABLET ORAL at 15:41

## 2017-05-10 RX ADMIN — Medication 5 MG: at 09:06

## 2017-05-10 RX ADMIN — LIDOCAINE HYDROCHLORIDE 50 MG: 20 INJECTION, SOLUTION INFILTRATION; PERINEURAL at 07:51

## 2017-05-10 RX ADMIN — Medication 5 MG: at 08:37

## 2017-05-10 RX ADMIN — PHENYLEPHRINE HYDROCHLORIDE 100 MCG: 10 INJECTION, SOLUTION INTRAMUSCULAR; INTRAVENOUS; SUBCUTANEOUS at 09:06

## 2017-05-10 RX ADMIN — FENTANYL CITRATE 25 MCG: 50 INJECTION, SOLUTION INTRAMUSCULAR; INTRAVENOUS at 08:01

## 2017-05-10 RX ADMIN — OXYCODONE HYDROCHLORIDE 5 MG: 5 TABLET ORAL at 22:16

## 2017-05-10 RX ADMIN — MIDAZOLAM HYDROCHLORIDE 1 MG: 1 INJECTION, SOLUTION INTRAMUSCULAR; INTRAVENOUS at 07:46

## 2017-05-10 RX ADMIN — PROPOFOL 75 MCG/KG/MIN: 10 INJECTION, EMULSION INTRAVENOUS at 07:51

## 2017-05-10 RX ADMIN — PHENYLEPHRINE HYDROCHLORIDE 100 MCG: 10 INJECTION, SOLUTION INTRAMUSCULAR; INTRAVENOUS; SUBCUTANEOUS at 08:35

## 2017-05-10 RX ADMIN — PHENYLEPHRINE HYDROCHLORIDE 100 MCG: 10 INJECTION, SOLUTION INTRAMUSCULAR; INTRAVENOUS; SUBCUTANEOUS at 08:40

## 2017-05-10 RX ADMIN — SODIUM CHLORIDE, POTASSIUM CHLORIDE, SODIUM LACTATE AND CALCIUM CHLORIDE: 600; 310; 30; 20 INJECTION, SOLUTION INTRAVENOUS at 08:20

## 2017-05-10 RX ADMIN — FENTANYL CITRATE 25 MCG: 50 INJECTION, SOLUTION INTRAMUSCULAR; INTRAVENOUS at 07:48

## 2017-05-10 RX ADMIN — OXYCODONE HYDROCHLORIDE 5 MG: 5 TABLET ORAL at 19:04

## 2017-05-10 RX ADMIN — PHENYLEPHRINE HYDROCHLORIDE 100 MCG: 10 INJECTION, SOLUTION INTRAMUSCULAR; INTRAVENOUS; SUBCUTANEOUS at 08:32

## 2017-05-10 RX ADMIN — BUPIVACAINE HYDROCHLORIDE IN DEXTROSE 15 MG: 7.5 INJECTION, SOLUTION SUBARACHNOID at 07:51

## 2017-05-10 ASSESSMENT — ENCOUNTER SYMPTOMS: DYSRHYTHMIAS: 0

## 2017-05-10 NOTE — ANESTHESIA POSTPROCEDURE EVALUATION
Patient: Daly Dixon    Procedure(s):  LEFT TOTAL HIP ARTHROPLASTY (SMITH AND NEPHEW)^ - Wound Class: I-Clean    Diagnosis:DJD LEFT HIP  Diagnosis Additional Information: No value filed.    Anesthesia Type:  Spinal    Note:  Anesthesia Post Evaluation    Patient location during evaluation: PACU  Patient participation: Able to fully participate in evaluation  Level of consciousness: awake  Pain management: adequate  Airway patency: patent  Cardiovascular status: acceptable  Respiratory status: acceptable  Hydration status: acceptable  PONV: controlled     Anesthetic complications: None          Last vitals:  Vitals:    05/10/17 1330 05/10/17 1541 05/10/17 1617   BP: 137/68  144/80   Pulse:      Resp: 16 16 16   Temp:   36.4  C (97.6  F)   SpO2: 100%  99%         Electronically Signed By: Katelin Rosales MD  May 10, 2017  6:28 PM

## 2017-05-10 NOTE — PROGRESS NOTES
Admission medication history interview status for the 5/10/2017  admission is complete. See EPIC admission navigator for prior to admission medications     Medication history source reliability:Good    Medication history interview source(s):Patient    Medication history resources (including written lists, pill bottles, clinic record):None    Primary pharmacy.[facility]    Additional medication history information not noted on PTA med list :None    Time spent in this activity: 45 minutes  Prior to Admission medications    Medication Sig Last Dose Taking? Auth Provider   losartan-hydrochlorothiazide (HYZAAR) 100-12.5 MG per tablet Take 1 tablet by mouth daily 5/9/2017 at am Yes Reported, Patient   AMLODIPINE BESYLATE PO Take 5 mg by mouth daily 5/10/2017 at 0400 Yes Reported, Patient   METOPROLOL SUCCINATE ER PO Take 100 mg by mouth daily 5/10/2017 at 0400 Yes Reported, Patient   Ibuprofen (ADVIL PO) Take 200-600 mg by mouth every 6 hours as needed for moderate pain 5/7/2017 at prn Yes Reported, Patient

## 2017-05-10 NOTE — IP AVS SNAPSHOT
MRN:[id number]                      After Visit Summary   5/10/2017    Daly Dixon    MRN: [id number]           Thank you!     Thank you for choosing [facility] for your care. Our goal is always to provide you with excellent care. Hearing back from our patients is one way we can continue to improve our services. Please take a few minutes to complete the written survey that you may receive in the mail after you visit with us. Thank you!        Patient Information     Date Of Birth          1950        Designated Caregiver       Most Recent Value    Caregiver    Will someone help with your care after discharge? yes    Name of designated caregiver jayden    Phone number of caregiver [phone]    Caregiver address [address] [facility] [address] [place], [place]      About your hospital stay     You were admitted on:  May 10, 2017 You last received care in the:  [place] [facility] [address] [facility]    You were discharged on:  May 11, 2017       Who to Call     For medical emergencies, please call 911.  For non-urgent questions about your medical care, please call your primary care provider or clinic, [phone]  For questions related to your surgery, please call your surgery clinic        Attending Provider     Provider Specialty    Reyes Bob MD Orthopedics       Primary Care Provider Office Phone # Fax #    Angelabdirashid Miguel Fox [phone]       [address] [address]        Follow-up Appointments     Follow-up and recommended labs and tests        Follow up with Dr. oBb in 7-10 days.                  Your next 10 appointments already scheduled     May 15, 2017  4:20 PM CDT   TRUDY Extremity with Jayden Carolina PT   [facility] [place] [facility] ([facility] [place])    [address] [place] [address]  [phone]  [place] [id number] [phone]              Further instructions from your care team      "  TOTAL HIP REPLACEMENT TAKE HOME INSTRUCTIONS  Your surgeon will answer any questions about your progress. General guidelines for your care are listed below. Your surgeon may give additional instructions for your care at home. Please follow them carefully.    Activity Level  1. Physical activity may be resumed gradually according to your comfort level and your surgeon s instructions. Follow your exercise program as instructed by your therapist. Do exercises at least twice daily. Refer to pages 19-22 of your \"Total Hip Replacement \" booklet for details.  2. Complete exercises two hours before bedtime to minimize the effect pain may have on sleep.  3. Do not cross legs. Do not bend past 90 .    Good Health Practices  1. Maintain an adequate fluid intake and eat a well balanced diet.  2. Be sure to include the basic food groups, such as dairy products, meat/fish, vegetables, and fruit. Each of these foods contribute to wound healing and increasing your strength.  3. Surgery, decreased activity and pain medication all contribute to a descrease in bowel activity that can result in constipation. It is recommended that you increase your liquid intake, add fiber to your diet, increase activity, and decrease pain medication use. If you have any problems, notify your physician.  4. Notify your dentist of your total hip surgery and call your dentist one week before a dental appointment for antibiotics.  If dentist will not prescribe antibiotics call your surgeon to ask on next steps.      Incision/Dressing Care  1. Keep incision clean and dry.  2. Cover incision if you are still having drainage.  3.  If you have a waterproof dressing ________may_____________   Shower.    Things to Watch For  1. Check incision daily for increased redness, tenderness, swelling, or drainage along the incision line. If these occur, please notify your doctor. Also, call if you develop a fever above 101 .  2. Please notify your doctor if you " "experience any calf pain and/or if you have surgical pain not relieved by the pain medication prescribed by your doctor.            Pending Results     No orders found for last 3 day(s).            Statement of Approval     Ordered          17 0746  I have reviewed and agree with all the recommendations and orders detailed in this document.  EFFECTIVE NOW     Approved and electronically signed by:  Reyes Bob MD             Admission Information     Date & Time Provider Department Dept. Phone    5/10/2017 Reyes Bob MD [place] [address] [facility] [phone]      Your Vitals Were     Blood Pressure Pulse Temperature Respirations Height Weight    144/67 96 98.2  F (36.8  C) 16 1.626 m (5' 4\") 66.3 kg (146 lb 2 oz)    Last Period Pulse Oximetry BMI (Body Mass Index)             2004 96% 25.08 kg/m2         MyChart Information     [employer] lets you send messages to your doctor, view your test results, renew your prescriptions, schedule appointments and more. To sign up, go to [email or web address] . Click on \"Log in\" on the left side of the screen, which will take you to the Welcome page. Then click on \"Sign up Now\" on the right side of the page.     You will be asked to enter the access code listed below, as well as some personal information. Please follow the directions to create your username and password.     Your access code is: [email or web address]  Expires: 2017  8:48 AM     Your access code will  in 90 days. If you need help or a new code, please call your [place] clinic or [phone].        Care EveryWhere ID     This is your Care EveryWhere ID. This could be used by other organizations to access your [place] medical records  [id number]           Review of your medicines      START taking        Dose / Directions    order for DME        Equipment being ordered: Walker Wheels () and Walker () Treatment Diagnosis: Impaired gait   Quantity:  1 each "   Refills:  0       oxyCODONE 5 MG IR tablet   Commonly known as:  ROXICODONE        Dose:  5-10 mg   Take 1-2 tablets (5-10 mg) by mouth every 3 hours as needed for moderate to severe pain   Quantity:  30 tablet   Refills:  0       rivaroxaban ANTICOAGULANT 10 MG Tabs tablet   Commonly known as:  XARELTO        Dose:  10 mg   Take 1 tablet (10 mg) by mouth daily for 14 days   Quantity:  14 tablet   Refills:  0         CONTINUE these medicines which have NOT CHANGED        Dose / Directions    ADVIL PO        Dose:  200-600 mg   Take 200-600 mg by mouth every 6 hours as needed for moderate pain   Refills:  0       AMLODIPINE BESYLATE PO        Dose:  5 mg   Take 5 mg by mouth daily   Refills:  0       losartan-hydrochlorothiazide 100-12.5 MG per tablet   Commonly known as:  HYZAAR        Dose:  1 tablet   Take 1 tablet by mouth daily   Refills:  0       METOPROLOL SUCCINATE ER PO        Dose:  100 mg   Take 100 mg by mouth daily   Refills:  0            Where to get your medicines      These medications were sent to [facility] [place] - [address]  [address], [place] [address]     Phone:  [phone]     rivaroxaban ANTICOAGULANT 10 MG Tabs tablet         Some of these will need a paper prescription and others can be bought over the counter. Ask your nurse if you have questions.     Bring a paper prescription for each of these medications     order for DME    oxyCODONE 5 MG IR tablet                Protect others around you: Learn how to safely use, store and throw away your medicines at www.disposemymeds.org.             Medication List: This is a list of all your medications and when to take them. Check marks below indicate your daily home schedule. Keep this list as a reference.      Medications           Morning Afternoon Evening Bedtime As Needed    ADVIL PO   Take 200-600 mg by mouth every 6 hours as needed for moderate pain   Next Dose Due:  Avoid while on Xeralto                                    AMLODIPINE BESYLATE PO   Take 5 mg by mouth daily   Last time this was given:  5 mg on 5/11/2017  8:44 AM   Next Dose Due:  5/12/2017                                   losartan-hydrochlorothiazide 100-12.5 MG per tablet   Commonly known as:  HYZAAR   Take 1 tablet by mouth daily   Last time this was given:  1 tablet on 5/11/2017  8:43 AM   Next Dose Due:  5/12/2017                                    METOPROLOL SUCCINATE ER PO   Take 100 mg by mouth daily   Last time this was given:  100 mg on 5/11/2017  8:44 AM   Next Dose Due:  5/12/2017                                   order for DME   Equipment being ordered: Walker Wheels () and Walker () Treatment Diagnosis: Impaired gait                                oxyCODONE 5 MG IR tablet   Commonly known as:  ROXICODONE   Take 1-2 tablets (5-10 mg) by mouth every 3 hours as needed for moderate to severe pain   Last time this was given:  5 mg on 5/11/2017 12:05 PM   Next Dose Due:  On or after 3 pm as needed                                     rivaroxaban ANTICOAGULANT 10 MG Tabs tablet   Commonly known as:  XARELTO   Take 1 tablet (10 mg) by mouth daily for 14 days   Last time this was given:  10 mg on 5/11/2017  8:43 AM   Next Dose Due:  5/12/2017

## 2017-05-10 NOTE — IP AVS SNAPSHOT
[address] [facility]    [address] [place] [place] [address]    Phone:  [phone]                                       After Visit Summary   5/10/2017    Daly Dixon    MRN: [id number]           After Visit Summary Signature Page     I have received my discharge instructions, and my questions have been answered. I have discussed any challenges I see with this plan with the nurse or doctor.    ..........................................................................................................................................  Patient/Patient Representative Signature      ..........................................................................................................................................  Patient Representative Print Name and Relationship to Patient    ..................................................               ................................................  Date                                            Time    ..........................................................................................................................................  Reviewed by Signature/Title    ...................................................              ..............................................  Date                                                            Time

## 2017-05-10 NOTE — ANESTHESIA CARE TRANSFER NOTE
Patient: Daly Dixon    Procedure(s):  LEFT TOTAL HIP ARTHROPLASTY (SMITH AND NEPHEW)^ - Wound Class: I-Clean    Diagnosis: DJD LEFT HIP  Diagnosis Additional Information: No value filed.    Anesthesia Type:   Spinal     Note:  Airway :Room Air  Patient transferred to:PACU    Transferred to PACU, spontaneous respirations, alert and talking with staff. Patient's oxygen saturations maintained greater than 95% on room air. All monitors and alarms on and functioning, clinically stable vital signs. Report given to PACU RN and questions answered.     Electronically Signed By: OSWALD Oakley CRNA  May 10, 2017  9:17 AM

## 2017-05-10 NOTE — ANESTHESIA PREPROCEDURE EVALUATION
Anesthesia Evaluation     . Pt has had prior anesthetic.     No history of anesthetic complications          ROS/MED HX    ENT/Pulmonary:      (-) sleep apnea   Neurologic:     (+)migraines,     Cardiovascular:     (+) Dyslipidemia, hypertension----. : . . . :. .      (-) arrhythmias, irregular heartbeat/palpitations and valvular problems/murmurs   METS/Exercise Tolerance:     Hematologic:         Musculoskeletal:         GI/Hepatic:        (-) GERD   Renal/Genitourinary:         Endo:         Psychiatric:         Infectious Disease:         Malignancy:         Other:                     Physical Exam  Normal systems: cardiovascular and pulmonary    Airway   Mallampati: II  TM distance: >3 FB  Neck ROM: full    Dental   (+) other and missing  Comment: Lost tooth recently, left upper incisor--cemented in place    Cardiovascular       Pulmonary                     Anesthesia Plan      History & Physical Review  History and physical reviewed and following examination; no interval change.    ASA Status:  2 .    NPO Status:  > 8 hours    Plan for Spinal   PONV prophylaxis:  Ondansetron (or other 5HT-3)       Postoperative Care      Consents  Anesthetic plan, risks, benefits and alternatives discussed with:  Patient..                          .

## 2017-05-10 NOTE — PROGRESS NOTES
05/10/17 1500   Quick Adds   Type of Visit Initial PT Evaluation   Living Environment   Lives With spouse   Living Arrangements house   Number of Stairs to Enter Home 1   Number of Stairs Within Home 0   Stair Railings at Home inside, present at both sides   Transportation Available car;family or friend will provide   Self-Care   Usual Activity Tolerance good   Current Activity Tolerance moderate   Regular Exercise yes   Equipment Currently Used at Home walker, rolling   Activity/Exercise/Self-Care Comment patient would prefer crutches   Functional Level Prior   Ambulation 0-->independent   Transferring 0-->independent   Toileting 0-->independent   Bathing 0-->independent   Dressing 0-->independent   Fall history within last six months no   Prior Functional Level Comment Very active- golf, pilates, tennis   General Information   Onset of Illness/Injury or Date of Surgery - Date 05/10/17   Referring Physician MD Paulino   Patient/Family Goals Statement Return home   Pertinent History of Current Problem (include personal factors and/or comorbidities that impact the POC) 67 year old female s/p L MONIQUE (anterior revision). PMH for R MONIQUE, multiple bilateral knee surgeries   Precautions/Limitations fall precautions   Weight-Bearing Status - LLE weight-bearing as tolerated   General Info Comments Activity orders: ambulate with assist   Cognitive Status Examination   Orientation orientation to person, place and time   Level of Consciousness alert   Follows Commands and Answers Questions 100% of the time;able to follow multistep instructions   Personal Safety and Judgment intact   Pain Assessment   Patient Currently in Pain (L hip pain 4/10)   Integumentary/Edema   Integumentary/Edema no deficits were identifed   Posture    Posture Not impaired   Range of Motion (ROM)   ROM Comment B LE WNL for functional mobility   Strength   Strength Comments poor L quad set, unable to complete SLR in supine   Bed Mobility   Bed Mobility  "Comments supine to/from with with SBA   Transfer Skills   Transfer Comments sit to/from stand with SBA   Gait   Gait Comments ambulated 3 steps with FWW and CGA lateraly to head of bed. ambulated 10 feet to/from bathroom with FWW and CGA.   Balance   Balance Comments no LOB with gait or toileting activities   Sensory Examination   Sensory Perception Comments no numbness/tingling   Coordination   Coordination no deficits were identified   Muscle Tone   Muscle Tone no deficits were identified   General Therapy Interventions   Planned Therapy Interventions balance training;gait training;neuromuscular re-education;bed mobility training;strengthening;transfer training;home program guidelines   Clinical Impression   Criteria for Skilled Therapeutic Intervention yes, treatment indicated   PT Diagnosis impaired gait   Influenced by the following impairments LE strength, balance and activity tolerance   Functional limitations due to impairments transfers, gait, stairs   Clinical Presentation Stable/Uncomplicated   Clinical Presentation Rationale clinical judgement   Clinical Decision Making (Complexity) Low complexity   Therapy Frequency` 2 times/day   Predicted Duration of Therapy Intervention (days/wks) 3 days   Anticipated Equipment Needs at Discharge (patient requets crutches)   Anticipated Discharge Disposition Home with Outpatient Therapy   Risk & Benefits of therapy have been explained Yes   Patient, Family & other staff in agreement with plan of care Yes   Clinical Impression Comments Patient appropriate for skilled PT care to address functional deficits in mobiltiy   [facility]AC TM \"6 Clicks\"   2016, Trustees of [facility], under license to [employer].  All rights reserved.   6 Clicks Short Forms Basic Mobility Inpatient Short Form   [facility] AM-PAC  \"6 Clicks\" V.2 Basic Mobility Inpatient Short Form   1. Turning from your back to your side while in a flat bed without using bedrails? 4 " - None   2. Moving from lying on your back to sitting on the side of a flat bed without using bedrails? 4 - None   3. Moving to and from a bed to a chair (including a wheelchair)? 3 - A Little   4. Standing up from a chair using your arms (e.g., wheelchair, or bedside chair)? 3 - A Little   5. To walk in hospital room? 3 - A Little   6. Climbing 3-5 steps with a railing? 2 - A Lot   Basic Mobility Raw Score (Score out of 24.Lower scores equate to lower levels of function) 19   Total Evaluation Time   Total Evaluation Time (Minutes) 10

## 2017-05-10 NOTE — PLAN OF CARE
Problem: Goal Outcome Summary  Goal: Goal Outcome Summary  PT: Orders received, evaluation completed, treatment initiated. 67 year old female s/p L MONIQUE (anterior revision). PMH for R MONIQUE, multiple bilateral knee surgeries. Patient lives at home with . 1 step to get into home. Bedroom and bathroom available on main level with no stairs to access. Prior to admission, patient was independent with gait and all ADLs.     BPCI Care Plan: Home with outpatient PT.     Current Functional Status: Ambulated 3 steps with FWW and CGA lateraly to head of bed. Ambulated 10 feet to/from bathroom with FWW and CGA. Supine to/from sit with SBA. Sit to/from stand with SBA. VSS thorughout session. Quad set fair with SAQ.     Barriers to Plan: Steps.

## 2017-05-10 NOTE — PLAN OF CARE
Problem: Goal Outcome Summary  Goal: Goal Outcome Summary  Outcome: No Change  Pt is DOS arrived around 1030, IV infusing and VSS on RA. Pt is A&Ox4 and tolerating clear liquids so she was advanced to regular diet. Pain is well controlled with IV dilaudid and scheduled tylenol. Pt is DTV, BS in PACU for 95cc. Pt had spinal anesthesia with 100cc EBL. Aquacel dressing is C/D/I and CMS intact, pulses +2. Pt has not gotten OOB yet, first therapy is at 1530. Pt is progressing well per plan of care.

## 2017-05-10 NOTE — BRIEF OP NOTE
[facility] Brief Operative Note    Pre-operative diagnosis: DJD LEFT HIP   Post-operative diagnosis * No post-op diagnosis entered *     Procedure: Procedure(s):  LEFT TOTAL HIP ARTHROPLASTY (SMITH AND NEPHEW)^ - Wound Class: I-Clean   Surgeon(s): Surgeon(s) and Role:     * Reyes Bob MD - Primary   Estimated blood loss: * No values recorded between 5/10/2017  8:06 AM and 5/10/2017  8:49 AM *    Specimens: * No specimens in log *   Findings:

## 2017-05-11 ENCOUNTER — APPOINTMENT (OUTPATIENT)
Dept: PHYSICAL THERAPY | Facility: CLINIC | Age: 67
DRG: 470 | End: 2017-05-11
Attending: ORTHOPAEDIC SURGERY
Payer: MEDICARE

## 2017-05-11 VITALS
OXYGEN SATURATION: 96 % | HEIGHT: 64 IN | TEMPERATURE: 98.2 F | DIASTOLIC BLOOD PRESSURE: 67 MMHG | RESPIRATION RATE: 16 BRPM | WEIGHT: 146.13 LBS | SYSTOLIC BLOOD PRESSURE: 144 MMHG | HEART RATE: 96 BPM | BODY MASS INDEX: 24.95 KG/M2

## 2017-05-11 LAB
HGB BLD-MCNC: 12.8 G/DL (ref 11.7–15.7)
INTERPRETATION ECG - MUSE: NORMAL

## 2017-05-11 PROCEDURE — 97530 THERAPEUTIC ACTIVITIES: CPT | Mod: GP | Performed by: PHYSICAL THERAPIST

## 2017-05-11 PROCEDURE — 97116 GAIT TRAINING THERAPY: CPT | Mod: GP | Performed by: PHYSICAL THERAPIST

## 2017-05-11 PROCEDURE — 85018 HEMOGLOBIN: CPT | Performed by: ORTHOPAEDIC SURGERY

## 2017-05-11 PROCEDURE — 25000132 ZZH RX MED GY IP 250 OP 250 PS 637: Mod: GY | Performed by: ORTHOPAEDIC SURGERY

## 2017-05-11 PROCEDURE — 36415 COLL VENOUS BLD VENIPUNCTURE: CPT | Performed by: ORTHOPAEDIC SURGERY

## 2017-05-11 PROCEDURE — 40000193 ZZH STATISTIC PT WARD VISIT: Performed by: PHYSICAL THERAPIST

## 2017-05-11 PROCEDURE — 97110 THERAPEUTIC EXERCISES: CPT | Mod: GP | Performed by: PHYSICAL THERAPIST

## 2017-05-11 PROCEDURE — A9270 NON-COVERED ITEM OR SERVICE: HCPCS | Mod: GY | Performed by: ORTHOPAEDIC SURGERY

## 2017-05-11 RX ORDER — OXYCODONE HYDROCHLORIDE 5 MG/1
5-10 TABLET ORAL
Qty: 30 TABLET | Refills: 0 | Status: SHIPPED | OUTPATIENT
Start: 2017-05-11

## 2017-05-11 RX ADMIN — LOSARTAN POTASSIUM AND HYDROCHLOROTHIAZIDE 1 TABLET: 100; 12.5 TABLET, FILM COATED ORAL at 08:43

## 2017-05-11 RX ADMIN — RIVAROXABAN 10 MG: 10 TABLET, FILM COATED ORAL at 08:43

## 2017-05-11 RX ADMIN — OXYCODONE HYDROCHLORIDE 5 MG: 5 TABLET ORAL at 07:37

## 2017-05-11 RX ADMIN — AMLODIPINE BESYLATE 5 MG: 5 TABLET ORAL at 08:44

## 2017-05-11 RX ADMIN — OXYCODONE HYDROCHLORIDE 10 MG: 5 TABLET ORAL at 01:31

## 2017-05-11 RX ADMIN — OXYCODONE HYDROCHLORIDE 5 MG: 5 TABLET ORAL at 04:33

## 2017-05-11 RX ADMIN — METOPROLOL SUCCINATE 100 MG: 100 TABLET, EXTENDED RELEASE ORAL at 08:44

## 2017-05-11 RX ADMIN — OXYCODONE HYDROCHLORIDE 5 MG: 5 TABLET ORAL at 12:05

## 2017-05-11 NOTE — PLAN OF CARE
Problem: Goal Outcome Summary  Goal: Goal Outcome Summary  Physical Therapy Discharge Summary     Reason for therapy discharge:    Discharged to home.     Progress towards therapy goal(s). See goals on Care Plan in [facility] electronic health record for goal details.  Goals partially met.  Barriers to achieving goals:   discharge from facility.     Therapy recommendation(s):    Continue home exercise program.

## 2017-05-11 NOTE — OP NOTE
DATE OF PROCEDURE:  05/10/2017      PREOPERATIVE DIAGNOSIS:  Degenerative joint disease, left hip.      POSTOPERATIVE DIAGNOSIS:  Degenerative joint disease, left hip.      PROCEDURE:  Left total hip arthroplasty.      ASSISTANT:   [facility] Surg Tech      ANESTHESIA:  Spinal.      INDICATIONS:  Daly Dixon is a 67-year-old woman who has had an excellent result from previous right total hip arthroplasty by myself 7 years ago.  She comes in now having failed a nonoperative treatment program for degenerative arthritis of her opposite left hip.  Appropriate risks and alternatives have been discussed at length, and she has elected to proceed with surgical intervention.      DESCRIPTION OF PROCEDURE:  The patient was brought to the operating room, given a spinal anesthetic, positioned right side down and the left hip area prepped and draped sterilely in the usual manner.      We made our standard mini lateral incisions.  Dissection was carried down sharply to the fascia, fascia was split in line with its fibers.  The anterior one-half of the abductors dissected free off the greater trochanter, capsule opened and the hip dislocated anteriorly.  Inspection of the joint revealed complete loss of articular cartilage.  We osteotomized the femoral neck at an appropriate level.      Attention was turned to the acetabulum.  Progressively larger reamers and broaches were used until we sized for a 52 mm cup.  The 52 mm R3 cup was driven into position, 45 degrees of inclination and 30 degrees of anteversion.  Excellent fixation was achieved.      Attention was turned back to the femur.  Progressively larger reamers and broaches were used until we had sized for a #11 component with standard offset.  Trial reduction was done and with the 20 degree build-up liner in the superior posterior position and a -3 head, we had restored leg length equal to the opposite side with excellent position and excellent stability.      Hip  was redislocated and trial components removed.  The real 20 degree build up acetabular liner positioned.  We drove the femoral component with excellent fixation.  We retrialed the head and again chose a -3 36 mm diameter Oxinium head.  This was driven onto the C taper.  Final range of motion again gave excellent stability with excellent restoration of leg length.      Wound was irrigated copiously with antibiotic solution.  Hemostasis was obtained by electrocautery.  Closure done in layers, closing the abductors back to greater trochanter with #1 Vicryl, fascia with #1 Vicryl, subcutaneous tissue with 2-0 Vicryl, staples on the skin, sterile compressive dressing applied.  The patient awakened and taken to the recovery room in good condition.  There were no intraoperative complications and minimal blood loss.         REYES MOORE MD             D: 05/10/2017 08:56   T: 05/10/2017 19:38   MT: NATALIE#[id number]      Name:     VEENA DOMINIQUE   MRN:      [id number]        Account:        [id number]   :      1950           Procedure Date: 05/10/2017      Document: [id number]       cc: Reyes Moore MD

## 2017-05-11 NOTE — DISCHARGE INSTRUCTIONS
"TOTAL HIP REPLACEMENT TAKE HOME INSTRUCTIONS  Your surgeon will answer any questions about your progress. General guidelines for your care are listed below. Your surgeon may give additional instructions for your care at home. Please follow them carefully.    Activity Level  1. Physical activity may be resumed gradually according to your comfort level and your surgeon s instructions. Follow your exercise program as instructed by your therapist. Do exercises at least twice daily. Refer to pages 19-22 of your \"Total Hip Replacement \" booklet for details.  2. Complete exercises two hours before bedtime to minimize the effect pain may have on sleep.  3. Do not cross legs. Do not bend past 90 .    Good Health Practices  1. Maintain an adequate fluid intake and eat a well balanced diet.  2. Be sure to include the basic food groups, such as dairy products, meat/fish, vegetables, and fruit. Each of these foods contribute to wound healing and increasing your strength.  3. Surgery, decreased activity and pain medication all contribute to a descrease in bowel activity that can result in constipation. It is recommended that you increase your liquid intake, add fiber to your diet, increase activity, and decrease pain medication use. If you have any problems, notify your physician.  4. Notify your dentist of your total hip surgery and call your dentist one week before a dental appointment for antibiotics.  If dentist will not prescribe antibiotics call your surgeon to ask on next steps.      Incision/Dressing Care  1. Keep incision clean and dry.  2. Cover incision if you are still having drainage.  3.  If you have a waterproof dressing ________may_____________   Shower.    Things to Watch For  1. Check incision daily for increased redness, tenderness, swelling, or drainage along the incision line. If these occur, please notify your doctor. Also, call if you develop a fever above 101 .  2. Please notify your doctor if you " experience any calf pain and/or if you have surgical pain not relieved by the pain medication prescribed by your doctor.

## 2017-05-11 NOTE — PLAN OF CARE
Problem: Goal Outcome Summary  Goal: Goal Outcome Summary  Outcome: Improving  Doing well post-op. Up with 1/walker to bathroom. Passing flatus. Po ok. Pain well controlled with 1 oxycodone. Small drainage to aquacel dressing. Will continue to monitor.

## 2017-05-11 NOTE — PLAN OF CARE
Problem: Goal Outcome Summary  Goal: Goal Outcome Summary  Outcome: Improving  A&O, vs stable, drsg intact with dried drainage.  Pain controlled with oxycodone, up with assist x 1 and walker, voiding, IV saline locked.  Progressing per plan of care.

## 2017-05-11 NOTE — PLAN OF CARE
Problem: Goal Outcome Summary  Goal: Goal Outcome Summary  BPCI Care Plan: Discharge to home and OP PT      Current Functional Status: Pt transferred sup>sit SBA, transferred sit<>stand x 3 with FWW at SBA. Pt ambulated 170 ft with FWW at SBA, and 170 ft with use of axillary crutches at SBA.  Pt ascended and descended 3 stairs x 2 at CGA with use of axillary crutch on L, and use of railing on R.       Barriers to Plan: None

## 2017-05-11 NOTE — PROGRESS NOTES
"SPIRITUAL HEALTH SERVICES Progress Note  [facility]    Met with pt per request in chart.  Pt talked about her hip replacement surgery yesterday--her second hip replacement.  She said, \"A lot has changed in 7 years.\"  She is feeling relatively well, and may be able to dc tomorrow.  She asked for prayer, specifically talking about a brother-in-law who is seriously ill in [place].  I engaged her in supportive conversation and shared prayer with her.  SH team is available if additional needs arise.                                                                                                                                                 Valeri Ardon M.A.  Staff   Pager [phone]  Phone [phone]      "

## 2017-05-11 NOTE — PROGRESS NOTES
Daly Dixon  5/11/2017  POD # 1  Subjective:     Doing well.  No immediate surgical complications identified.  Pain well-controlled.  Objective:    Exam:  CMS: intact  alert, stable, wound ok        PLAN: Start physical therapy  Discharge plan: Home later today or tomorrow.

## 2017-05-11 NOTE — PLAN OF CARE
Problem: Goal Outcome Summary  Goal: Goal Outcome Summary  BPCI Care Plan: Disch to home with help of her  and OPPT.     Current Functional Status: PT: c/o nausea and 2/10 L hip pain.  Needs SBA and min vc for exercise, bed mobility, transfers, and gait with FWW, WBAT L, 160', and up and down 10 steps with sideways walker and contralateral hand rail.  Patient states preference for disch to home today.  DME request for fitting and issuance of FWW at disch completed.     Barriers to Plan: None.

## 2017-05-15 ENCOUNTER — THERAPY VISIT (OUTPATIENT)
Dept: PHYSICAL THERAPY | Facility: CLINIC | Age: 67
End: 2017-05-15
Payer: MEDICARE

## 2017-05-15 DIAGNOSIS — Z47.1 AFTERCARE FOLLOWING LEFT HIP JOINT REPLACEMENT SURGERY: Primary | ICD-10-CM

## 2017-05-15 DIAGNOSIS — Z96.642 AFTERCARE FOLLOWING LEFT HIP JOINT REPLACEMENT SURGERY: Primary | ICD-10-CM

## 2017-05-15 DIAGNOSIS — Z96.649 HIP JOINT REPLACEMENT STATUS: ICD-10-CM

## 2017-05-15 PROCEDURE — 97161 PT EVAL LOW COMPLEX 20 MIN: CPT | Mod: GP

## 2017-05-15 PROCEDURE — G8979 MOBILITY GOAL STATUS: HCPCS | Mod: GP

## 2017-05-15 PROCEDURE — 97110 THERAPEUTIC EXERCISES: CPT | Mod: GP

## 2017-05-15 PROCEDURE — G8978 MOBILITY CURRENT STATUS: HCPCS | Mod: GP

## 2017-05-15 ASSESSMENT — ACTIVITIES OF DAILY LIVING (ADL)
GETTING_INTO_AND_OUT_OF_AN_AVERAGE_CAR: SLIGHT DIFFICULTY
LIGHT_TO_MODERATE_WORK: SLIGHT DIFFICULTY
WALKING_INITIALLY: SLIGHT DIFFICULTY
STANDING_FOR_15_MINUTES: SLIGHT DIFFICULTY
GOING_UP_1_FLIGHT_OF_STAIRS: SLIGHT DIFFICULTY
HEAVY_WORK: SLIGHT DIFFICULTY
SITTING_FOR_15_MINUTES: SLIGHT DIFFICULTY
GOING_DOWN_1_FLIGHT_OF_STAIRS: SLIGHT DIFFICULTY
STEPPING_UP_AND_DOWN_CURBS: SLIGHT DIFFICULTY
PUTTING_ON_SOCKS_AND_SHOES: EXTREME DIFFICULTY

## 2017-05-15 NOTE — MR AVS SNAPSHOT
"              After Visit Summary   5/15/2017    Daly Dixon    MRN: [id number]           Patient Information     Date Of Birth          1950        Visit Information        Provider Department      5/15/2017 4:20 PM Jayden Carolina PT [facility]        Today's Diagnoses     Aftercare following left hip joint replacement surgery    -  1    Hip joint replacement status           Follow-ups after your visit        Your next 10 appointments already scheduled     May 26, 2017  8:20 AM CDT   TRUDY Extremity with Jayden Carolina PT   [facility] ([facility] [place])    [address] [place] [address]  [id number]  [place] [address] [phone]              Who to contact     If you have questions or need follow up information about today's clinic visit or your schedule please contact [facility] directly at [phone].  Normal or non-critical lab and imaging results will be communicated to you by Trip4realhart, letter or phone within 4 business days after the clinic has received the results. If you do not hear from us within 7 days, please contact the clinic through [email or web address] or phone. If you have a critical or abnormal lab result, we will notify you by phone as soon as possible.  Submit refill requests through Refresh Body or call your pharmacy and they will forward the refill request to us. Please allow 3 business days for your refill to be completed.          Additional Information About Your Visit        Trip4realhart Information     Refresh Body lets you send messages to your doctor, view your test results, renew your prescriptions, schedule appointments and more. To sign up, go to [email or web address] . Click on \"Log in\" on the left side of the screen, which will take you to the Welcome page. Then click on \"Sign up Now\" on the right side of the page.     You will be asked to enter the access " code listed below, as well as some personal information. Please follow the directions to create your username and password.     Your access code is: [email or web address]  Expires: 2017  8:48 AM     Your access code will  in 90 days. If you need help or a new code, please call your [facility] clinic or [phone].        Care EveryWhere ID     This is your Care EveryWhere ID. This could be used by other organizations to access your [facility] medical records  [id number]        Your Vitals Were     Last Period                   2004            Blood Pressure from Last 3 Encounters:   17 144/67   17 184/85   04 148/88    Weight from Last 3 Encounters:   05/10/17 66.3 kg (146 lb 2 oz)   17 66.2 kg (146 lb)   04 65.8 kg (145 lb)              We Performed the Following     HC PT EVAL, LOW COMPLEXITY     TRUDY CERT REPORT     TRUDY INITIAL EVAL REPORT     THERAPEUTIC EXERCISES        Primary Care Provider Office Phone # Fax #    Rayshawn Fox [phone]       [place] [phone]        Thank you!     Thank you for choosing [facility]  for your care. Our goal is always to provide you with excellent care. Hearing back from our patients is one way we can continue to improve our services. Please take a few minutes to complete the written survey that you may receive in the mail after your visit with us. Thank you!             Your Updated Medication List - Protect others around you: Learn how to safely use, store and throw away your medicines at www.disposemymeds.org.          This list is accurate as of: 5/15/17  6:10 PM.  Always use your most recent med list.                   Brand Name Dispense Instructions for use    ADVIL PO      Take 200-600 mg by mouth every 6 hours as needed for moderate pain       AMLODIPINE BESYLATE PO      Take 5 mg by mouth daily        losartan-hydrochlorothiazide 100-12.5 MG per tablet    HYZAAR     Take 1 tablet by mouth daily       METOPROLOL SUCCINATE ER PO      Take 100 mg by mouth daily       * order for DME     1 each    Equipment being ordered: Walker Wheels () and Walker () Treatment Diagnosis: Impaired gait       * order for DME     1 each    Equipment being ordered: Crutches () Treatment Diagnosis: Difficulty with gait.       oxyCODONE 5 MG IR tablet    ROXICODONE    30 tablet    Take 1-2 tablets (5-10 mg) by mouth every 3 hours as needed for moderate to severe pain       rivaroxaban ANTICOAGULANT 10 MG Tabs tablet    XARELTO    14 tablet    Take 1 tablet (10 mg) by mouth daily for 14 days       * Notice:  This list has 2 medication(s) that are the same as other medications prescribed for you. Read the directions carefully, and ask your doctor or other care provider to review them with you.

## 2017-05-15 NOTE — PROGRESS NOTES
Subjective:    Patient is a 67 year old female presenting with rehab left hip hpi.   Daly Dixon is a 67 year old female with a left hip condition.  Condition occurred with:  Degenerative joint disease.  Condition occurred: other.  This is a new condition  S/P L MONIQUE on 5/10/17.  Hx of R MONIQUE 2010.  .      Radiates to:  Hip.  Pain is described as aching and is constant and reported as 2/10.  Associated symptoms:  Loss of motion/stiffness and loss of strength. Pain is the same all the time.  Symptoms are exacerbated by ascending stairs, descending stairs and bending/squatting and relieved by rest and activity/movement.  Since onset symptoms are gradually improving.                                                      Objective:      Gait:    Gait Type:  Antalgic   Weight Bearing Status:  WBAT   Assistive Devices:  Crutches                                                   Hip Evaluation  HIP AROM:    Flexion: Left: 90   Right:       Abduction: Left: 20    Right:     Adduction: Left: 0   Right:      Knee Flexion: Left: 135    Right:  Knee Extension: Left: 0   Right:    Hip Strength:    Flexion:   Left: 3-/5   Pain:                      Extension:  Left: 3+/5  Pain:  Abduction:  Left: 3-/5     Pain:                                 General     ROS    Assessment/Plan:      Patient is a 67 year old female with left side hip complaints.    Patient has the following significant findings with corresponding treatment plan.                Diagnosis 1:  S/P L MONIQUE 5/10/17  Decreased ROM/flexibility - manual therapy and therapeutic exercise  Decreased strength - therapeutic exercise and therapeutic activities  Impaired muscle performance - neuro re-education  Decreased function - therapeutic activities    Therapy Evaluation Codes:   1) History comprised of:   Personal factors that impact the plan of care:      None.    Comorbidity factors that impact the plan of care are:      None.     Medications impacting care:  Pain.  2) Examination of Body Systems comprised of:   Body structures and functions that impact the plan of care:      Hip.   Activity limitations that impact the plan of care are:      Squatting/kneeling, Stairs and Walking.  3) Clinical presentation characteristics are:   Stable/Uncomplicated.  4) Decision-Making    Low complexity using standardized patient assessment instrument and/or measureable assessment of functional outcome.  Cumulative Therapy Evaluation is: Low complexity.    Previous and current functional limitations:  (See Goal Flow Sheet for this information)    Short term and Long term goals: (See Goal Flow Sheet for this information)     Communication ability:  Patient appears to be able to clearly communicate and understand verbal and written communication and follow directions correctly.  Treatment Explanation - The following has been discussed with the patient:   RX ordered/plan of care  Anticipated outcomes  Possible risks and side effects  This patient would benefit from PT intervention to resume normal activities.   Rehab potential is good.    Frequency:  2 X week, once daily  Duration:  for 4 weeks  Discharge Plan:  Achieve all LTG.  Independent in home treatment program.  Reach maximal therapeutic benefit.    Please refer to the daily flowsheet for treatment today, total treatment time and time spent performing 1:1 timed codes.

## 2017-05-15 NOTE — LETTER
DEPARTMENT OF HEALTH AND HUMAN SERVICES  [facility]ICA[facility]    PLAN/UPDATED PLAN OF PROGRESS FOR OUTPATIENT REHABILITATION    PATIENTS NAME:  Daly Dixon     : 1950    PROVIDER NUMBER:    [id number]    [facility]    PROVIDER NAME: [facility] [place] [facility]    MEDICAL RECORD NUMBER: [id number]     START OF CARE DATE:  SOC Date: 05/15/17   TYPE:  PT    PRIMARY/TREATMENT DIAGNOSIS: (Pertinent Medical Diagnosis)     Hip joint replacement status  Aftercare following left hip joint replacement surgery    VISITS FROM START OF CARE:  Rxs Used: 1     Subjective:    Patient is a 67 year old female presenting with rehab left hip hpi.   Daly Dixon is a 67 year old female with a left hip condition.  Condition occurred with:  Degenerative joint disease.  Condition occurred: other.  This is a new condition  S/P L MONIQUE on 5/10/17.  Hx of R MONIQUE .  .     Radiates to:  Hip.  Pain is described as aching and is constant and reported as 2/10.  Associated symptoms:  Loss of motion/stiffness and loss of strength. Pain is the same all the time.  Symptoms are exacerbated by ascending stairs, descending stairs and bending/squatting and relieved by rest and activity/movement.  Since onset symptoms are gradually improving.                        Pertinent medical history includes:  High blood pressure and menopausal.  Medical allergies: yes (NKA ).  Other surgeries include:  Orthopedic surgery (Right hip , right knee acl, post and medil , left knee scope  , Right knee scope ).  Current medications:  Pain medication and high blood pressure medication.  Current occupation is Retired                                           Objective:    Gait:    Gait Type:  Antalgic   Weight Bearing Status:  WBAT   Assistive Devices:  Crutches  Hip Evaluation  HIP AROM:    Flexion: Left: 90   Right:   Abduction: Left: 20    Right:   Adduction: Left: 0   Right:  Knee  Flexion: Left: 135    Right:  Knee Extension: Left: 0   Right:    Hip Strength:    Flexion:   Left: 3-/5   Pain:             Extension:  Left: 3+/5  Pain:  Abduction:  Left: 3-/5     Pain:    Assessment/Plan:      Patient is a 67 year old female with left side hip complaints.    Patient has the following significant findings with corresponding treatment plan.                Diagnosis 1:  S/P L MONIQUE 5/10/17  Decreased ROM/flexibility - manual therapy and therapeutic exercise  Decreased strength - therapeutic exercise and therapeutic activities  Impaired muscle performance - neuro re-education  Decreased function - therapeutic activities    Therapy Evaluation Codes:   1) History comprised of:   Personal factors that impact the plan of care:      None.    Comorbidity factors that impact the plan of care are:      None.     Medications impacting care: Pain.  2) Examination of Body Systems comprised of:   Body structures and functions that impact the plan of care:      Hip.   Activity limitations that impact the plan of care are:      Squatting/kneeling, Stairs and Walking.  3) Clinical presentation characteristics are:   Stable/Uncomplicated.  4) Decision-Making    Low complexity using standardized patient assessment instrument and/or measureable assessment of functional outcome.  Cumulative Therapy Evaluation is: Low complexity.              Previous and current functional limitations:  (See Goal Flow Sheet for this information)    Short term and Long term goals: (See Goal Flow Sheet for this information)   Communication ability:  Patient appears to be able to clearly communicate and understand verbal and written communication and follow directions correctly.  Treatment Explanation - The following has been discussed with the patient:   RX ordered/plan of care  Anticipated outcomes  Possible risks and side effects  This patient would benefit from PT intervention to resume normal activities.   Rehab potential is  "good.  Frequency:  2 X week, once daily  Duration:  for 4 weeks  Discharge Plan:  Achieve all LTG.  Independent in home treatment program.  Reach maximal therapeutic benefit.    Caregiver Signature/Credentials _____________________________ Date ________       Treating Provider: Jayden Carolina, PT     I have reviewed and certified the need for these services and plan of treatment while under my care.        PHYSICIAN'S SIGNATURE:   _________________________________________  Date___________   Reyes Bob MD     Certification period:  Beginning of Cert date period: 05/15/17 to  End of Cert period date: 08/12/17     Functional Level Progress Report: Please see attached \"Goal Flow sheet for Functional level.\"    ____X____ Continue Services or       ________ DC Services                Service dates: From  SOC Date: 05/15/17 date to present                         "

## 2017-05-16 NOTE — PROGRESS NOTES
Subjective:    Patient is a 67 year old female presenting with rehab left ankle/foot hpi.                                      Pertinent medical history includes:  High blood pressure and menopausal.  Medical allergies: yes (NKA ).  Other surgeries include:  Orthopedic surgery (Right hip 2010, right knee acl, post and medil 1997, left knee scope 2004 , Right knee scope 2012).  Current medications:  Pain medication and high blood pressure medication.  Current occupation is Retired  .                                    Objective:    System    Physical Exam    General     ROS    Assessment/Plan:

## 2017-05-26 ENCOUNTER — THERAPY VISIT (OUTPATIENT)
Dept: PHYSICAL THERAPY | Facility: CLINIC | Age: 67
End: 2017-05-26
Payer: MEDICARE

## 2017-05-26 DIAGNOSIS — Z47.1 AFTERCARE FOLLOWING LEFT HIP JOINT REPLACEMENT SURGERY: ICD-10-CM

## 2017-05-26 DIAGNOSIS — Z96.642 STATUS POST TOTAL REPLACEMENT OF LEFT HIP: ICD-10-CM

## 2017-05-26 DIAGNOSIS — Z96.642 AFTERCARE FOLLOWING LEFT HIP JOINT REPLACEMENT SURGERY: ICD-10-CM

## 2017-05-26 PROCEDURE — 97110 THERAPEUTIC EXERCISES: CPT | Mod: GP

## 2017-05-26 PROCEDURE — 97530 THERAPEUTIC ACTIVITIES: CPT | Mod: GP

## 2017-06-02 ENCOUNTER — THERAPY VISIT (OUTPATIENT)
Dept: PHYSICAL THERAPY | Facility: CLINIC | Age: 67
End: 2017-06-02
Payer: MEDICARE

## 2017-06-02 DIAGNOSIS — Z96.642 AFTERCARE FOLLOWING LEFT HIP JOINT REPLACEMENT SURGERY: ICD-10-CM

## 2017-06-02 DIAGNOSIS — Z96.642 STATUS POST TOTAL REPLACEMENT OF LEFT HIP: ICD-10-CM

## 2017-06-02 DIAGNOSIS — Z47.1 AFTERCARE FOLLOWING LEFT HIP JOINT REPLACEMENT SURGERY: ICD-10-CM

## 2017-06-02 PROCEDURE — 97530 THERAPEUTIC ACTIVITIES: CPT | Mod: GP

## 2017-06-02 PROCEDURE — 97110 THERAPEUTIC EXERCISES: CPT | Mod: GP

## 2017-06-09 ENCOUNTER — THERAPY VISIT (OUTPATIENT)
Dept: PHYSICAL THERAPY | Facility: CLINIC | Age: 67
End: 2017-06-09
Payer: MEDICARE

## 2017-06-09 DIAGNOSIS — Z96.642 AFTERCARE FOLLOWING LEFT HIP JOINT REPLACEMENT SURGERY: ICD-10-CM

## 2017-06-09 DIAGNOSIS — Z47.1 AFTERCARE FOLLOWING LEFT HIP JOINT REPLACEMENT SURGERY: ICD-10-CM

## 2017-06-09 DIAGNOSIS — Z96.642 STATUS POST TOTAL REPLACEMENT OF LEFT HIP: ICD-10-CM

## 2017-06-09 PROCEDURE — 97530 THERAPEUTIC ACTIVITIES: CPT | Mod: GP

## 2017-06-09 PROCEDURE — 97110 THERAPEUTIC EXERCISES: CPT | Mod: GP

## 2017-06-16 ENCOUNTER — THERAPY VISIT (OUTPATIENT)
Dept: PHYSICAL THERAPY | Facility: CLINIC | Age: 67
End: 2017-06-16
Payer: MEDICARE

## 2017-06-16 DIAGNOSIS — Z96.642 AFTERCARE FOLLOWING LEFT HIP JOINT REPLACEMENT SURGERY: ICD-10-CM

## 2017-06-16 DIAGNOSIS — Z47.1 AFTERCARE FOLLOWING LEFT HIP JOINT REPLACEMENT SURGERY: ICD-10-CM

## 2017-06-16 DIAGNOSIS — Z96.642 STATUS POST TOTAL REPLACEMENT OF LEFT HIP: ICD-10-CM

## 2017-06-16 PROCEDURE — 97110 THERAPEUTIC EXERCISES: CPT | Mod: GP

## 2017-06-16 NOTE — DISCHARGE SUMMARY
Orthopedic Discharge Summary    Daly Dixon MRN# [id number]   YOB: 1950 Age: 67 year old     Date of Admission:  5/10/2017  Date of Discharge:  5/11/2017  2:43 PM  Admitting Physician:  Reyes Bob MD  Discharge Physician:  Marcela att. providers found  Discharging Service:  Orthopedics     Home clinic: [facility]  Primary Provider: Rayshawn Fox          Admission Diagnoses:   DJD LEFT HIP  S/P total hip arthroplasty          Discharge Diagnosis:   Patient Active Problem List   Diagnosis     S/P total hip arthroplasty     Aftercare following left hip joint replacement surgery                Discharge Disposition:   Discharged to home           Condition on Discharge:   Discharge condition: Stable           Medications Prior to Admission:     No prescriptions prior to admission.             Discharge Medications:     No current facility-administered medications for this encounter.      Current Outpatient Prescriptions   Medication Sig     oxyCODONE (ROXICODONE) 5 MG IR tablet Take 1-2 tablets (5-10 mg) by mouth every 3 hours as needed for moderate to severe pain     rivaroxaban ANTICOAGULANT (XARELTO) 10 MG TABS tablet Take 1 tablet (10 mg) by mouth daily for 14 days     order for DME Equipment being ordered: Walker Wheels () and Walker ()  Treatment Diagnosis: Impaired gait     order for DME Equipment being ordered: Crutches ()  Treatment Diagnosis: Difficulty with gait.     losartan-hydrochlorothiazide (HYZAAR) 100-12.5 MG per tablet Take 1 tablet by mouth daily     AMLODIPINE BESYLATE PO Take 5 mg by mouth daily     METOPROLOL SUCCINATE ER PO Take 100 mg by mouth daily     Ibuprofen (ADVIL PO) Take 200-600 mg by mouth every 6 hours as needed for moderate pain               Brief History of Illness:   Daly Dixon is a 67 year old female who was admitted for total hip          Hospital Course:     S/P total hip arthroplasty    * No resolved hospital  problems. *              Discharge Instructions and Follow-Up:   Discharge diet: Regular   Discharge activity: Activity as tolerated   Discharge follow-up: Follow up with Dr. Bob in 7-10 days   Outpatient therapy: None    Home Care agency: None    Supplies and equipment: None   Lines and drains: None    Wound care: None   Other instructions: None

## 2017-06-23 ENCOUNTER — THERAPY VISIT (OUTPATIENT)
Dept: PHYSICAL THERAPY | Facility: CLINIC | Age: 67
End: 2017-06-23
Payer: MEDICARE

## 2017-06-23 DIAGNOSIS — Z96.642 AFTERCARE FOLLOWING LEFT HIP JOINT REPLACEMENT SURGERY: ICD-10-CM

## 2017-06-23 DIAGNOSIS — Z47.1 AFTERCARE FOLLOWING LEFT HIP JOINT REPLACEMENT SURGERY: ICD-10-CM

## 2017-06-23 DIAGNOSIS — Z96.642 STATUS POST TOTAL REPLACEMENT OF LEFT HIP: ICD-10-CM

## 2017-06-23 PROCEDURE — 97530 THERAPEUTIC ACTIVITIES: CPT | Mod: GP

## 2017-06-23 PROCEDURE — 97110 THERAPEUTIC EXERCISES: CPT | Mod: GP

## 2017-07-12 ENCOUNTER — THERAPY VISIT (OUTPATIENT)
Dept: PHYSICAL THERAPY | Facility: CLINIC | Age: 67
End: 2017-07-12
Payer: MEDICARE

## 2017-07-12 DIAGNOSIS — Z96.642 S/P TOTAL HIP ARTHROPLASTY, LEFT: ICD-10-CM

## 2017-07-12 DIAGNOSIS — Z96.642 AFTERCARE FOLLOWING LEFT HIP JOINT REPLACEMENT SURGERY: ICD-10-CM

## 2017-07-12 DIAGNOSIS — Z47.1 AFTERCARE FOLLOWING LEFT HIP JOINT REPLACEMENT SURGERY: ICD-10-CM

## 2017-07-12 PROCEDURE — 97110 THERAPEUTIC EXERCISES: CPT | Mod: GP

## 2017-07-12 PROCEDURE — 97530 THERAPEUTIC ACTIVITIES: CPT | Mod: GP

## 2017-07-12 ASSESSMENT — ACTIVITIES OF DAILY LIVING (ADL)
ROLLING_OVER_IN_BED: NO DIFFICULTY AT ALL
WALKING_15_MINUTES_OR_GREATER: NO DIFFICULTY AT ALL
GETTING_INTO_AND_OUT_OF_AN_AVERAGE_CAR: NO DIFFICULTY AT ALL
HOS_ADL_HIGHEST_POTENTIAL_SCORE: 68
STEPPING_UP_AND_DOWN_CURBS: NO DIFFICULTY AT ALL
TWISTING/PIVOTING_ON_INVOLVED_LEG: SLIGHT DIFFICULTY
WALKING_INITIALLY: NO DIFFICULTY AT ALL
HOS_ADL_SCORE(%): 94.12
HEAVY_WORK: SLIGHT DIFFICULTY
GETTING_INTO_AND_OUT_OF_A_BATHTUB: NO DIFFICULTY AT ALL
PUTTING_ON_SOCKS_AND_SHOES: NO DIFFICULTY AT ALL
DEEP_SQUATTING: NO DIFFICULTY AT ALL
WALKING_UP_STEEP_HILLS: SLIGHT DIFFICULTY
RECREATIONAL_ACTIVITIES: SLIGHT DIFFICULTY
GOING_UP_1_FLIGHT_OF_STAIRS: NO DIFFICULTY AT ALL
HOS_ADL_ITEM_SCORE_TOTAL: 64
WALKING_APPROXIMATELY_10_MINUTES: NO DIFFICULTY AT ALL
HOS_ADL_COUNT: 17
SITTING_FOR_15_MINUTES: NO DIFFICULTY AT ALL
STANDING_FOR_15_MINUTES: NO DIFFICULTY AT ALL
LIGHT_TO_MODERATE_WORK: NO DIFFICULTY AT ALL
WALKING_DOWN_STEEP_HILLS: NO DIFFICULTY AT ALL
GOING_DOWN_1_FLIGHT_OF_STAIRS: NO DIFFICULTY AT ALL

## 2017-12-18 PROBLEM — Z96.642 AFTERCARE FOLLOWING LEFT HIP JOINT REPLACEMENT SURGERY: Status: RESOLVED | Noted: 2017-05-15 | Resolved: 2017-12-18

## 2017-12-18 PROBLEM — Z96.649 S/P TOTAL HIP ARTHROPLASTY: Status: RESOLVED | Noted: 2017-05-10 | Resolved: 2017-12-18

## 2017-12-18 PROBLEM — Z47.1 AFTERCARE FOLLOWING LEFT HIP JOINT REPLACEMENT SURGERY: Status: RESOLVED | Noted: 2017-05-15 | Resolved: 2017-12-18

## (undated) DEVICE — PACK TOTAL HIP W/POUCH SOP15HPFSM

## (undated) DEVICE — PAD FOAM MCGUIRE KIT 0814-0150

## (undated) DEVICE — MANIFOLD NEPTUNE 4 PORT 700-20

## (undated) DEVICE — GLOVE PROTEXIS W/NEU-THERA 7.5  2D73TE75

## (undated) DEVICE — SU VICRYL 2-0 CP-1 27" UND J266H

## (undated) DEVICE — IMM PILLOW ABDUCT HIP MED 31143061

## (undated) DEVICE — GLOVE PROTEXIS POWDER FREE 7.5 ORTHOPEDIC 2D73ET75

## (undated) DEVICE — SU VICRYL 1 CP-1 27" J468H

## (undated) DEVICE — GLOVE PROTEXIS BLUE W/NEU-THERA 7.5  2D73EB75

## (undated) DEVICE — SUCTION IRR SYSTEM W/O TIP INTERPULSE HANDPIECE 0210-100-000

## (undated) DEVICE — DRSG STERI STRIP 1/2X4" R1547

## (undated) DEVICE — SOL BENZOIN 0.5OZ

## (undated) DEVICE — SOL NACL 0.9% IRRIG 1000ML BOTTLE 07138-09

## (undated) DEVICE — BLADE SAW SAGITTAL STRK 25X90X1.27MM HD SYS 6 6125-127-090

## (undated) DEVICE — PREP CHLORAPREP 26ML TINTED ORANGE  260815

## (undated) DEVICE — DRSG AQUACEL AG 3.5X9.75" HYDROFIBER 412011

## (undated) DEVICE — GLOVE PROTEXIS POWDER FREE 7.0 ORTHOPEDIC 2D73ET70

## (undated) DEVICE — LINEN TOWEL PACK X5 5464

## (undated) DEVICE — GLOVE PROTEXIS W/NEU-THERA 7.0  2D73TE70

## (undated) DEVICE — BONE CLEANING TIP INTERPULSE  0210-010-000

## (undated) DEVICE — ESU GROUND PAD UNIVERSAL W/O CORD

## (undated) DEVICE — DRAPE IOBAN INCISE 23X17" 6650EZ

## (undated) RX ORDER — CEFAZOLIN SODIUM 2 G/100ML
INJECTION, SOLUTION INTRAVENOUS
Status: DISPENSED
Start: 2017-05-10

## (undated) RX ORDER — LIDOCAINE HYDROCHLORIDE 20 MG/ML
INJECTION, SOLUTION EPIDURAL; INFILTRATION; INTRACAUDAL; PERINEURAL
Status: DISPENSED
Start: 2017-05-10

## (undated) RX ORDER — PROPOFOL 10 MG/ML
INJECTION, EMULSION INTRAVENOUS
Status: DISPENSED
Start: 2017-05-10

## (undated) RX ORDER — FENTANYL CITRATE 50 UG/ML
INJECTION, SOLUTION INTRAMUSCULAR; INTRAVENOUS
Status: DISPENSED
Start: 2017-05-10

## (undated) RX ORDER — ONDANSETRON 2 MG/ML
INJECTION INTRAMUSCULAR; INTRAVENOUS
Status: DISPENSED
Start: 2017-05-10